# Patient Record
Sex: FEMALE | Race: WHITE | NOT HISPANIC OR LATINO | Employment: FULL TIME
[De-identification: names, ages, dates, MRNs, and addresses within clinical notes are randomized per-mention and may not be internally consistent; named-entity substitution may affect disease eponyms.]

---

## 2017-05-26 ENCOUNTER — HEALTH MAINTENANCE LETTER (OUTPATIENT)
Age: 22
End: 2017-05-26

## 2018-03-23 ENCOUNTER — NURSE TRIAGE (OUTPATIENT)
Dept: NURSING | Facility: CLINIC | Age: 23
End: 2018-03-23

## 2018-03-23 NOTE — TELEPHONE ENCOUNTER
Denise suffered cat bite, has been on abx x 24 hours.  Redness / swelling slightly worsened.  Advised at this point, to continue abx and if any worsening from now, to be seen.      Additional Information    [1] Taking antibiotic < 72 hours (3 days) AND [2] symptoms are SAME (not improved) (all triage questions negative)    Negative: [1] Taking antibiotic AND [2] symptoms are BETTER AND [3] no fever (all triage questions negative)    Negative: [1] Finished taking antibiotics AND [2] symptoms are BETTER but [3] not completely gone    Negative: [1] Taking antibiotic AND [2] new onset of fever    Negative: [1] Taking antibiotic > 48 hours (2 days) AND [2] fever still present (SAME)    Negative: [1] Taking antibiotic > 72 hours (3 days) AND [2] symptoms (other than fever) not improved    Negative: Caller has NON-URGENT question and triager unable to answer question    Negative: [1] Taking antibiotics > 24 hours AND [2] symptoms WORSE    Negative: [1] Recent hospitalization AND [2] symptoms WORSE    Negative: [1] Diabetes mellitus or weak immune system (e.g., HIV positive, cancer chemo, splenectomy, organ transplant, chronic steroids) AND [2] new onset of fever > 100.5 F (38.1 C)    Negative: Caller has URGENT question and triager unable to answer question    Negative: MODERATE difficulty breathing (e.g., speaks in phrases, SOB even at rest, pulse 100-120)    Negative: Fever > 103 F (39.4 C)    Negative: Patient sounds very sick or weak to the triager    Negative: [1] Recent hospitalization for pneumonia AND [2] taking an antibiotic    Negative: [1] Animal bite infection AND [2] taking an antibiotic    Negative: [1] Ear  infection (Otitis Media) AND [2] taking an antibiotic    Negative: [1] Ear  infection (Swimmer's Ear)) AND [2] taking an antibiotic    Negative: [1] Sinus infection AND [2] taking an antibiotic    Negative: [1] Strep throat AND [2] taking an antibiotic    Negative: [1] Urinary tract  infection (e.g.,  cystitis, pyelonephritis, urethritis, epididymitis) AND [2] male AND [3] taking an antibiotic    Negative: [1] Urinary tract  infection (e.g., cystitis, pyelonephritis, urethritis) AND [2] female AND [3] taking an antibiotic    Negative: [1] Wound infection AND [2] taking an antibiotic    Negative: Severe difficulty breathing (e.g., struggling for each breath, speaks in single words)    Negative: Sounds like a life-threatening emergency to the triager    Negative: [1] Taking antibiotics < 48 hours AND [2] fever still present (SAME) (all triage questions negative)    Protocols used: INFECTION ON [facility]IOT[facility]

## 2020-06-23 ENCOUNTER — NURSE TRIAGE (OUTPATIENT)
Dept: NURSING | Facility: CLINIC | Age: 25
End: 2020-06-23

## 2020-06-23 NOTE — TELEPHONE ENCOUNTER
Patient has had slight vaginal discharge the past few days and is now slightly green with some foul odor.  Also some itchiness in vaginal area and more than normal headaches.  Transferred to scheduling for in office appointment within 3 days.    Additional Information    Vaginal discharge is main symptom    Negative: Followed a genital area injury    Negative: Symptoms could be from sexual assault(AFTER using this guideline to treat symptoms, go to guideline SEXUAL ASSAULT OR RAPE)    Negative: Pain or burning with passing urine (urination) is main symptom    Negative: Foreign body in vagina (e.g., tampon)    Negative: [1] Pregnant > 20 weeks  (5 months or more) AND [2] contractions    Negative: Pregnant    Negative: [1] SEVERE abdominal pain (e.g., excruciating) AND [2] present > 1 hour    Negative: Patient sounds very sick or weak to the triager    Negative: [1] Yellow or green vaginal discharge AND [2] fever    Negative: [1] Genital area looks infected (e.g., draining sore, spreading redness) AND [2] fever    Negative: [1] Constant abdominal pain AND [2] present > 2 hours    Negative: [1] Mild lower abdominal pain comes and goes (cramps) AND [2] lasts > 24 hours    Negative: Genital area looks infected (e.g., draining sore, spreading redness)    Negative: [1] Rash is tiny water blisters AND [2] 3 or more    Negative: [1] Rash (e.g., redness, tiny bumps, sore) of genital area AND [2] present > 24 hours    Bad smelling vaginal discharge    Protocols used: VAGINAL SYMPTOMS-A-AH, VAGINAL [id number]

## 2020-06-25 ENCOUNTER — OFFICE VISIT (OUTPATIENT)
Dept: INTERNAL MEDICINE | Facility: CLINIC | Age: 25
End: 2020-06-25
Payer: COMMERCIAL

## 2020-06-25 VITALS
WEIGHT: 141 LBS | BODY MASS INDEX: 27.68 KG/M2 | TEMPERATURE: 98.6 F | RESPIRATION RATE: 18 BRPM | SYSTOLIC BLOOD PRESSURE: 113 MMHG | HEART RATE: 89 BPM | HEIGHT: 60 IN | DIASTOLIC BLOOD PRESSURE: 81 MMHG

## 2020-06-25 DIAGNOSIS — Z11.3 SCREEN FOR STD (SEXUALLY TRANSMITTED DISEASE): ICD-10-CM

## 2020-06-25 DIAGNOSIS — N89.8 VAGINAL DISCHARGE: Primary | ICD-10-CM

## 2020-06-25 DIAGNOSIS — B96.89 BV (BACTERIAL VAGINOSIS): ICD-10-CM

## 2020-06-25 DIAGNOSIS — N76.0 BV (BACTERIAL VAGINOSIS): ICD-10-CM

## 2020-06-25 DIAGNOSIS — N89.8 VAGINAL ITCHING: ICD-10-CM

## 2020-06-25 LAB
SPECIMEN SOURCE: ABNORMAL
WET PREP SPEC: ABNORMAL

## 2020-06-25 PROCEDURE — 99203 OFFICE O/P NEW LOW 30 MIN: CPT | Performed by: INTERNAL MEDICINE

## 2020-06-25 PROCEDURE — 87210 SMEAR WET MOUNT SALINE/INK: CPT | Performed by: INTERNAL MEDICINE

## 2020-06-25 RX ORDER — METRONIDAZOLE 500 MG/1
500 TABLET ORAL 2 TIMES DAILY
Qty: 14 TABLET | Refills: 0 | Status: SHIPPED | OUTPATIENT
Start: 2020-06-25 | End: 2020-07-02

## 2020-06-25 ASSESSMENT — MIFFLIN-ST. JEOR: SCORE: 1306.07

## 2020-06-25 NOTE — PROGRESS NOTES
Subjective     Denise Eng is a 25 year old female who presents to clinic today for the following health issues:  Patient is being seen for vaginal discharge.    HPI   Patient complains of vaginal discharge which was more than usual 2 days ago.  It was yellowish-white in color.  Patient also had symptoms of vaginal discharge and itching since 3 weeks.  Had 3-4 sexual partners in last 3 months.  Use protection with other partners and does not use protection with her .  Usually checks for chlamydia gonorrhea every 6 months.  Patient is on Nexplanon for contraception and does not remember when was her last menstrual cycle.      Patient Active Problem List   Diagnosis     Raynaud's phenomenon (by history or observed)     GERD (gastroesophageal reflux disease)     Contraception     Irregular periods     Anxiety     Past Surgical History:   Procedure Laterality Date     FRENECTOMY  9/4/2012    Procedure: FRENECTOMY;  Frenulectomy;  Surgeon: Neo Shaffer MD;  Location:  OR       Social History     Tobacco Use     Smoking status: Passive Smoke Exposure - Never Smoker     Smokeless tobacco: Never Used     Tobacco comment: Mom smokes outside.    Substance Use Topics     Alcohol use: Yes     Comment: rare     History reviewed. No pertinent family history.      Current Outpatient Medications   Medication Sig Dispense Refill     escitalopram (LEXAPRO) 10 MG tablet Take 1 tablet (10 mg) by mouth daily 90 tablet 1     etonogestrel (NEXPLANON) 68 MG IMPL 1 each (68 mg) by Subdermal route continuous 1 each 0     levonorgestrel-ethinyl estradiol (AVIANE,ALESSE,LESSINA) 0.1-20 MG-MCG per tablet Take 1 tablet by mouth daily 3 Package 3     metroNIDAZOLE (FLAGYL) 500 MG tablet Take 1 tablet (500 mg) by mouth 2 times daily for 7 days 14 tablet 0     Allergies   Allergen Reactions     No Known Allergies        Reviewed and updated as needed this visit by Provider      Review of Systems   Genitourinary: Positive for  vaginal discharge. Negative for menstrual problem and vaginal pain.          Objective    /81 (BP Location: Left arm, Patient Position: Sitting, Cuff Size: Adult Regular)   Pulse 89   Temp 98.6  F (37  C) (Oral)   Resp 18   Ht 1.524 m (5')   Wt 64 kg (141 lb)   BMI 27.54 kg/m    Body mass index is 27.54 kg/m .  Physical Exam  Vitals signs reviewed.   Genitourinary:     Comments: Creamy cervical discharge noted. No lesion in cervix, vagina noted           Assessment & Plan     Denise was seen today for vaginal problem.    Diagnoses and all orders for this visit:    Vaginal discharge  -     Wet prep  -     Neisseria gonorrhoeae PCR  -     Chlamydia trachomatis PCR  -     metroNIDAZOLE (FLAGYL) 500 MG tablet; Take 1 tablet (500 mg) by mouth 2 times daily for 7 days    Vaginal itching  -     Wet prep  -     Neisseria gonorrhoeae PCR  -     Chlamydia trachomatis PCR  -     metroNIDAZOLE (FLAGYL) 500 MG tablet; Take 1 tablet (500 mg) by mouth 2 times daily for 7 days  -     Chlamydia trachomatis PCR  -     Neisseria gonorrhoeae PCR    Screen for STD (sexually transmitted disease)  -     Wet prep  -     Neisseria gonorrhoeae PCR  -     Chlamydia trachomatis PCR  -     Chlamydia trachomatis PCR  -     Neisseria gonorrhoeae PCR    BV (bacterial vaginosis)  -     metroNIDAZOLE (FLAGYL) 500 MG tablet; Take 1 tablet (500 mg) by mouth 2 times daily for 7 days      No follow-ups on file.    Sathya العلي MD  [facility]

## 2020-06-25 NOTE — PATIENT INSTRUCTIONS
Patient Education     Vaginal Infection: Understanding the Vaginal Environment  The vagina is a canal. It connects the uterus (womb) to the outside of the body. It is home to many types of bacteria and other tiny organisms. These different bacteria most often stay balanced in number. This keeps the vagina healthy. If the balance changes, it can cause infection.   A healthy environment  Many types of bacteria are present in a healthy vagina. When balanced, they don t cause problems. Small amounts of yeast may also be present without causing problems. The most common type of bacteria in the vagina is lactobacillus. It helps keep the vagina at a low pH. A low pH keeps bad bacteria from taking over.  Normal vaginal discharge  The vagina makes fluid. It is sent out as discharge. This also keeps the vagina healthy. Normal discharge can be clear, white, or yellowish. Most women find that normal discharge varies in amount and color through the month.  An unhealthy environment  The vaginal environment may get out of balance. This may result in a vaginal infection. There are a few reasons this can happen. The pH may have changed. The amount of one organism, such as yeast, may increase. Or an outside organism may get into the vagina and throw off the balance:    Bacterial vaginosis (BV). BV is due to an imbalance in the normal bacteria in the vagina. Lactobacillus bacteria decrease. As a result, the numbers of bad bacteria increase.    Candidiasis (yeast infection). Yeast is a type of fungus. A yeast infection occurs when yeast cells in the vagina increase. They then attack vaginal tissues. A type of yeast called Candida albicans is often involved.    Trichomoniasis ( trich ). Trich is a parasite. It is passed from one person to another during sex. Men with trich often don t have any symptoms. In women, it can take weeks or months before symptoms appear.  Date Last Reviewed: 3/1/2017    5483-4225 [employer] [address]  [place] [address]. All rights reserved. This information is not intended as a substitute for professional medical care. Always follow your healthcare professional's instructions.

## 2020-06-26 LAB
C TRACH DNA SPEC QL NAA+PROBE: ABNORMAL
N GONORRHOEA DNA SPEC QL NAA+PROBE: ABNORMAL
SPECIMEN SOURCE: ABNORMAL
SPECIMEN SOURCE: ABNORMAL

## 2020-06-29 ENCOUNTER — TELEPHONE (OUTPATIENT)
Dept: INTERNAL MEDICINE | Facility: CLINIC | Age: 25
End: 2020-06-29

## 2020-06-29 DIAGNOSIS — Z11.3 SCREEN FOR STD (SEXUALLY TRANSMITTED DISEASE): Primary | ICD-10-CM

## 2020-06-29 NOTE — TELEPHONE ENCOUNTER
"Do not see another Urine Chlamydia and Gonorrhea test ordered.       Dr العلي:  \"Please tell her that chlamydia and gonorrhea testing were canceled due to collection.  I ordered a urine chlamydia and gonorrhea testing.  Please ask her to do it at her convenience.\"    "

## 2020-08-31 ENCOUNTER — VIRTUAL VISIT (OUTPATIENT)
Dept: INTERNAL MEDICINE | Facility: CLINIC | Age: 25
End: 2020-08-31
Payer: COMMERCIAL

## 2020-08-31 DIAGNOSIS — F41.9 ANXIETY: ICD-10-CM

## 2020-08-31 PROCEDURE — 99213 OFFICE O/P EST LOW 20 MIN: CPT | Mod: TEL | Performed by: NURSE PRACTITIONER

## 2020-08-31 RX ORDER — ESCITALOPRAM OXALATE 10 MG/1
10 TABLET ORAL DAILY
Qty: 90 TABLET | Refills: 0 | Status: SHIPPED | OUTPATIENT
Start: 2020-08-31 | End: 2020-10-30

## 2020-08-31 ASSESSMENT — ANXIETY QUESTIONNAIRES
7. FEELING AFRAID AS IF SOMETHING AWFUL MIGHT HAPPEN: SEVERAL DAYS
IF YOU CHECKED OFF ANY PROBLEMS ON THIS QUESTIONNAIRE, HOW DIFFICULT HAVE THESE PROBLEMS MADE IT FOR YOU TO DO YOUR WORK, TAKE CARE OF THINGS AT HOME, OR GET ALONG WITH OTHER PEOPLE: SOMEWHAT DIFFICULT
5. BEING SO RESTLESS THAT IT IS HARD TO SIT STILL: MORE THAN HALF THE DAYS
3. WORRYING TOO MUCH ABOUT DIFFERENT THINGS: NOT AT ALL
6. BECOMING EASILY ANNOYED OR IRRITABLE: MORE THAN HALF THE DAYS
2. NOT BEING ABLE TO STOP OR CONTROL WORRYING: SEVERAL DAYS
1. FEELING NERVOUS, ANXIOUS, OR ON EDGE: MORE THAN HALF THE DAYS
GAD7 TOTAL SCORE: 9

## 2020-08-31 ASSESSMENT — PATIENT HEALTH QUESTIONNAIRE - PHQ9: 5. POOR APPETITE OR OVEREATING: SEVERAL DAYS

## 2020-08-31 NOTE — PROGRESS NOTES
"Denise Simpson is a 25 year old female who is being evaluated via a billable phone visit.      The patient has been notified of following:     \"This video visit will be conducted via a call between you and your physician/provider. We have found that certain health care needs can be provided without the need for an in-person physical exam.  This service lets us provide the care you need with a video conversation.  If a prescription is necessary we can send it directly to your pharmacy.  If lab work is needed we can place an order for that and you can then stop by our lab to have the test done at a later time.    Video visits are billed at different rates depending on your insurance coverage.  Please reach out to your insurance provider with any questions.    If during the course of the call the physician/provider feels a phone visit is not appropriate, you will not be charged for this service.\"    Patient has given verbal consent for phone visit? Yes  How would you like to obtain your AVS? MyChart  If you are dropped from the video visit, the video invite should be resent to: Text to cell phone: [phone]  Will anyone else be joining your video visit? No      Subjective     Denise Simpson is a 25 year old female who presents today via video visit for the following health issues:    HPI    Anxiety Follow-Up    How are you doing with your anxiety since your last visit? No change  Has been off lexapro> 6 months, could not adhere to a schedule, got frustrated and stopped med.  Mild depression sx per her spouse    Are you having other symptoms that might be associated with anxiety? No    Have you had a significant life event? No     Are you feeling depressed? No    Do you have any concerns with your use of alcohol or other drugs? No    Social History     Tobacco Use     Smoking status: Passive Smoke Exposure - Never Smoker     Smokeless tobacco: Never Used     Tobacco comment: Mom smokes outside.    Substance " Use Topics     Alcohol use: Yes     Comment: rare     Drug use: No     MELLY-7 SCORE 6/10/2013 8/31/2020   Total Score 16 -   Total Score - 9     No flowsheet data found.  MELLY-7  8/31/2020   1. Feeling nervous, anxious, or on edge 2   2. Not being able to stop or control worrying 1   3. Worrying too much about different things 0   4. Trouble relaxing 1   5. Being so restless that it is hard to sit still 2   6. Becoming easily annoyed or irritable 2   7. Feeling afraid, as if something awful might happen 1   MELLY-7 Total Score 9   If you checked any problems, how difficult have they made it for you to do your work, take care of things at home, or get along with other people? Somewhat difficult         How many servings of fruits and vegetables do you eat daily?  2-3    On average, how many sweetened beverages do you drink each day (Examples: soda, juice, sweet tea, etc.  Do NOT count diet or artificially sweetened beverages)?   0    How many days per week do you exercise enough to make your heart beat faster? 4    How many minutes a day do you exercise enough to make your heart beat faster? 30 - 60    How many days per week do you miss taking your medication? 0             New Patient/Transfer of Care    Review of Systems   Constitutional, HEENT, cardiovascular, pulmonary, gi and gu systems are negative, except as otherwise noted.      Objective           Vitals:  No vitals were obtained today due to virtual visit.    Physical Exam     RESP: No audible wheeze, cough, or visible cyanosis.  No visible retractions or increased work of breathing.    PSYCH: Mentation appears normal, affect normal/bright, judgement and insight intact, normal speech and appearance well-groomed.              Assessment & Plan     Anxiety    - escitalopram (LEXAPRO) 10 MG tablet; Take 1 tablet (10 mg) by mouth daily  F/u in 3 months for med check  Call prn     BMI:   Estimated body mass index is 27.54 kg/m  as calculated from the following:     Height as of 6/25/20: 1.524 m (5').    Weight as of 6/25/20: 64 kg (141 lb).               Mary Rizzo NP  [facility]    Phone time 6 min

## 2020-09-01 ASSESSMENT — ANXIETY QUESTIONNAIRES: GAD7 TOTAL SCORE: 9

## 2020-09-04 ENCOUNTER — OFFICE VISIT (OUTPATIENT)
Dept: OBGYN | Facility: CLINIC | Age: 25
End: 2020-09-04
Payer: COMMERCIAL

## 2020-09-04 VITALS
BODY MASS INDEX: 27.68 KG/M2 | HEIGHT: 60 IN | DIASTOLIC BLOOD PRESSURE: 68 MMHG | WEIGHT: 141 LBS | SYSTOLIC BLOOD PRESSURE: 120 MMHG

## 2020-09-04 DIAGNOSIS — Z30.013 ENCOUNTER FOR INITIAL PRESCRIPTION OF INJECTABLE CONTRACEPTIVE: ICD-10-CM

## 2020-09-04 DIAGNOSIS — Z30.432 ENCOUNTER FOR REMOVAL OF INTRAUTERINE CONTRACEPTIVE DEVICE: ICD-10-CM

## 2020-09-04 DIAGNOSIS — Z11.3 SCREEN FOR STD (SEXUALLY TRANSMITTED DISEASE): Primary | ICD-10-CM

## 2020-09-04 DIAGNOSIS — N76.0 BACTERIAL VAGINOSIS: ICD-10-CM

## 2020-09-04 DIAGNOSIS — N89.8 VAGINAL ODOR: ICD-10-CM

## 2020-09-04 DIAGNOSIS — B96.89 BACTERIAL VAGINOSIS: ICD-10-CM

## 2020-09-04 DIAGNOSIS — Z30.09 ENCOUNTER FOR COUNSELING REGARDING CONTRACEPTION: ICD-10-CM

## 2020-09-04 LAB
SPECIMEN SOURCE: ABNORMAL
WET PREP SPEC: ABNORMAL

## 2020-09-04 PROCEDURE — 36415 COLL VENOUS BLD VENIPUNCTURE: CPT | Performed by: ADVANCED PRACTICE MIDWIFE

## 2020-09-04 PROCEDURE — 87591 N.GONORRHOEAE DNA AMP PROB: CPT | Performed by: ADVANCED PRACTICE MIDWIFE

## 2020-09-04 PROCEDURE — 87210 SMEAR WET MOUNT SALINE/INK: CPT | Performed by: ADVANCED PRACTICE MIDWIFE

## 2020-09-04 PROCEDURE — 87389 HIV-1 AG W/HIV-1&-2 AB AG IA: CPT | Performed by: ADVANCED PRACTICE MIDWIFE

## 2020-09-04 PROCEDURE — 86780 TREPONEMA PALLIDUM: CPT | Performed by: ADVANCED PRACTICE MIDWIFE

## 2020-09-04 PROCEDURE — 99202 OFFICE O/P NEW SF 15 MIN: CPT | Mod: 25 | Performed by: ADVANCED PRACTICE MIDWIFE

## 2020-09-04 PROCEDURE — 96372 THER/PROPH/DIAG INJ SC/IM: CPT | Mod: 59 | Performed by: ADVANCED PRACTICE MIDWIFE

## 2020-09-04 PROCEDURE — 87491 CHLMYD TRACH DNA AMP PROBE: CPT | Performed by: ADVANCED PRACTICE MIDWIFE

## 2020-09-04 PROCEDURE — 11982 REMOVE DRUG IMPLANT DEVICE: CPT | Performed by: ADVANCED PRACTICE MIDWIFE

## 2020-09-04 PROCEDURE — 86803 HEPATITIS C AB TEST: CPT | Performed by: ADVANCED PRACTICE MIDWIFE

## 2020-09-04 PROCEDURE — 87340 HEPATITIS B SURFACE AG IA: CPT | Performed by: ADVANCED PRACTICE MIDWIFE

## 2020-09-04 RX ORDER — MEDROXYPROGESTERONE ACETATE 150 MG/ML
150 INJECTION, SUSPENSION INTRAMUSCULAR
Status: DISCONTINUED | OUTPATIENT
Start: 2020-09-04 | End: 2020-11-18 | Stop reason: CLARIF

## 2020-09-04 RX ORDER — ACYCLOVIR 200 MG/1
200 CAPSULE ORAL
COMMUNITY
End: 2022-08-11

## 2020-09-04 RX ORDER — MEDROXYPROGESTERONE ACETATE 150 MG/ML
150 INJECTION, SUSPENSION INTRAMUSCULAR
Qty: 1 ML | Refills: 3 | OUTPATIENT
Start: 2020-09-04 | End: 2020-12-23

## 2020-09-04 RX ORDER — METRONIDAZOLE 7.5 MG/G
GEL TOPICAL DAILY
Qty: 45 G | Refills: 1 | Status: SHIPPED | OUTPATIENT
Start: 2020-09-04 | End: 2020-09-09

## 2020-09-04 RX ADMIN — MEDROXYPROGESTERONE ACETATE 150 MG: 150 INJECTION, SUSPENSION INTRAMUSCULAR at 09:55

## 2020-09-04 ASSESSMENT — MIFFLIN-ST. JEOR: SCORE: 1306.07

## 2020-09-04 NOTE — PROGRESS NOTES
Nexplanon Removal:     Is a pregnancy test required: No.  Was a consent obtained?  Yes    Denise Simpson is here for removal of etonogestrel implant Nexplanon/Implanon    Indication:       Preoperative Diagnosis: etonogestrel implant  Postoperative Diagnosis: etonogestrel implant removed

## 2020-09-04 NOTE — PROGRESS NOTES
Subjective  Denise Simpson is a 25 year old female who presents for birth control consultation. Current contraception method: Nexplanon.Sexually transmitted infection risk: moderate.  No LMP recorded. Patient has had an injection.  Menstrual flow: rare. Breastfeeding no. Has used depo provera and Nexplanon for contraception in the past.  Has used Nexplanon x 3 and it  this summer.  Does not want to use it again at this time, though did not have any problems with its use.  Reports having had vaginal odor, slight itching, and increased white thick vaginal discharge since .  She has had history of bacterial vaginosis in  and once last year.  Self reports that she had an episode in July, but was not tested at that time.  We did discuss her increased risk due to multiple sexual partners, though she does use condoms consistently.  We also reviewed that she could use suppressive therapy for bacterial vaginosis if it is there again.  She is very sure that she does not want to have children, nor does her .  They are both thinking of sterilization to help prevent pregnancy given their current lifestyle.  She was concerned that OB would not perform a tubal ligation given her age, but we discussed that there are other options that are long-term, but reversible.  At this time, she is not interested in those, but is interested in a consult.  She has used Depo-Provera in the past and we discussed using a form of birth control as a bridge until she can have the consult with OB.  After discussion of the different types of contraception, birth control pill, patch, NuvaRing, condoms, IUDs, she would like to use Depo-Provera again due to ease of its use.  We discussed side effects and ACHES.  She should call us if she has any of these concerns.  She is also interested in STD screening today as well as Nexplanon removal.      OBJECTIVE:  Vitals:    20 0903   BP: 120/68   BP Location: Left arm    Patient Position: Chair   Cuff Size: Adult Regular   Weight: 64 kg (141 lb)   Height: 1.524 m (5')       PROCEDURE:  Verbal and written consent obtained. Discussed risk of bleeding, infection, inability to remove device and bruising. Nexplanon device was palpated in her left side arm. The area was cleansed with betadine x3. Sterile gloves were donned. Then lidocaine 1% was injected under the skin at the distal end of the device. A 2mm incision was made with a scalpel over the distal end of the device, then the device was grasped with a sterile Laura clamp. The fibrous sheath encasing the Nexplanon was incised with a scalpel, then the device was removed without difficulty, intact. The incision was covered with a band-aid and the arm was wrapped with a pressure dressing for 6 hours. Patient tolerated procedure well.       Imani Yang CNM    Pertinent past medical history:   Past Medical History:   Diagnosis Date     GERD (gastroesophageal reflux disease) 9/22/2008     Past Surgical History:   Procedure Laterality Date     FRENECTOMY  9/4/2012    Procedure: FRENECTOMY;  Frenulectomy;  Surgeon: Neo Shaffer MD;  Location:  OR     Social History     Socioeconomic History     Marital status: Single     Spouse name: Not on file     Number of children: Not on file     Years of education: Not on file     Highest education level: Not on file   Occupational History     Not on file   Social Needs     Financial resource strain: Not on file     Food insecurity     Worry: Not on file     Inability: Not on file     Transportation needs     Medical: Not on file     Non-medical: Not on file   Tobacco Use     Smoking status: Passive Smoke Exposure - Never Smoker     Smokeless tobacco: Never Used     Tobacco comment: Mom smokes outside.    Substance and Sexual Activity     Alcohol use: Yes     Comment: rare     Drug use: No     Sexual activity: Yes     Partners: Male   Lifestyle     Physical activity     Days per week: Not  on file     Minutes per session: Not on file     Stress: Not on file   Relationships     Social connections     Talks on phone: Not on file     Gets together: Not on file     Attends Denominational service: Not on file     Active member of club or organization: Not on file     Attends meetings of clubs or organizations: Not on file     Relationship status: Not on file     Intimate partner violence     Fear of current or ex partner: Not on file     Emotionally abused: Not on file     Physically abused: Not on file     Forced sexual activity: Not on file   Other Topics Concern     Not on file   Social History Narrative     Not on file     No family history on file.  Current Outpatient Medications   Medication     acyclovir (ZOVIRAX) 200 MG capsule     escitalopram (LEXAPRO) 10 MG tablet     medroxyPROGESTERone (DEPO-PROVERA) 150 MG/ML IM injection     etonogestrel (NEXPLANON) 68 MG IMPL     Current Facility-Administered Medications   Medication     medroxyPROGESTERone (DEPO-PROVERA) syringe 150 mg         No LMP recorded. Patient has had an injection.      The following portions of the patient's history were reviewed and updated as appropriate: allergies, current medications, past medical history and problem list.    Review Of Systems  Skin: negative  Eyes: negative, negative for visual changes  Respiratory: No shortness of breath, dyspnea on exertion, cough, or hemoptysis  Cardiovascular: negative for heart disease  Gastrointestinal: negative  Genitourinary: positive for negative, vaginal discharge and vaginal odor x 13 days  Musculoskeletal: negative  Neurologic: negative for HA  Psychiatric: negative  Hematologic/Lymphatic/Immunologic: negative for blood clots  Endocrine: negative     Objective:     Exam:  Constitutional: healthy, alert, no distress and cooperative  Head: Normocephalic.  Cardiovascular: appears well perfused, color pink  Respiratory: negative findings: chest symmetric with normal A/P diameter, normal  respiratory rate and rhythm  : Normal external genitalia without lesions and female positive for increased white creamy vaginal discharge.  Cervix without lesions or abnormality, vagina pink, no pain with SSE  Musculoskeletal: gait normal and normal muscle tone  Skin: no suspicious lesions or rashes  Neurologic: negative findings: cranial nerves 2-12 intact  Psychiatric: mentation appears normal and affect normal/bright      Assessment:   Vaginal odor and discharge    Birth control consult: desires tubal consult and starting depo provera today as bridge with Nexplanon removal today    Plan:     -After discussion of methods, would like to use Depo provera and have OB consult for tubal.   -Verbal information given on this method. ACES (OCPs), PAINS (IUDs) warning signs reviewed.   -Labs HIV, Hep C, Hep B, RPR, wet prep, GC/CT  -Rx written for Depo q 3 mo.     TT with patient 30 mns, >50% time spent in counseling or coordination of care.     Imani Yang CNM

## 2020-09-04 NOTE — NURSING NOTE
Chief Complaint   Patient presents with     Vaginal Problem     Nexplanon    Clinic Administered Medication Documentation      Depo Provera Documentation    URINE HCG: not indicated    Depo-Provera Standing Order inclusion/exclusion criteria reviewed.   Patient meets: inclusion criteria     BP: 120/68  LAST PAP/EXAM: No results found for: PAP    Prior to injection, verified patient identity using patient's name and date of birth. Medication was administered. Please see MAR and medication order for additional information.     Was entire vial of medication used? Yes  Vial/Syringe: Single dose vial  Expiration Date:    Right Glute  Teresa Lyle CMA    Patient instructed to report any adverse reaction to staff immediately .  NEXT INJECTION DUE: 20 - 20        Initial /68 (BP Location: Left arm, Patient Position: Chair, Cuff Size: Adult Regular)   Ht 1.524 m (5')   Wt 64 kg (141 lb)   Breastfeeding No   BMI 27.54 kg/m   Estimated body mass index is 27.54 kg/m  as calculated from the following:    Height as of this encounter: 1.524 m (5').    Weight as of this encounter: 64 kg (141 lb).  BP completed using cuff size: regular    Questioned patient about current smoking habits.  Pt. has never smoked.      No obstetric history on file.    The following HM Due: NONE    Teresa Lyle CMA

## 2020-09-05 LAB — T PALLIDUM AB SER QL: NONREACTIVE

## 2020-09-06 LAB
C TRACH DNA SPEC QL NAA+PROBE: NEGATIVE
HBV SURFACE AG SERPL QL IA: NONREACTIVE
HCV AB SERPL QL IA: NONREACTIVE
HIV 1+2 AB+HIV1 P24 AG SERPL QL IA: NONREACTIVE
N GONORRHOEA DNA SPEC QL NAA+PROBE: NEGATIVE
SPECIMEN SOURCE: NORMAL
SPECIMEN SOURCE: NORMAL

## 2020-09-09 ENCOUNTER — TELEPHONE (OUTPATIENT)
Facility: CLINIC | Age: 25
End: 2020-09-09

## 2020-09-09 DIAGNOSIS — N76.0 BACTERIAL VAGINOSIS: Primary | ICD-10-CM

## 2020-09-09 DIAGNOSIS — B96.89 BACTERIAL VAGINOSIS: Primary | ICD-10-CM

## 2020-09-09 RX ORDER — METRONIDAZOLE 7.5 MG/G
1 GEL VAGINAL DAILY
Qty: 25 G | Refills: 0 | Status: SHIPPED | OUTPATIENT
Start: 2020-09-09 | End: 2020-09-14

## 2020-09-16 ENCOUNTER — MYC MEDICAL ADVICE (OUTPATIENT)
Dept: INTERNAL MEDICINE | Facility: CLINIC | Age: 25
End: 2020-09-16

## 2020-10-30 ENCOUNTER — OFFICE VISIT (OUTPATIENT)
Dept: OBGYN | Facility: CLINIC | Age: 25
End: 2020-10-30
Payer: COMMERCIAL

## 2020-10-30 VITALS
SYSTOLIC BLOOD PRESSURE: 122 MMHG | BODY MASS INDEX: 26.31 KG/M2 | HEIGHT: 60 IN | WEIGHT: 134 LBS | DIASTOLIC BLOOD PRESSURE: 60 MMHG

## 2020-10-30 DIAGNOSIS — Z30.09 ENCOUNTER FOR COUNSELING REGARDING CONTRACEPTION: Primary | ICD-10-CM

## 2020-10-30 DIAGNOSIS — N94.89 MENSTRUAL SUPPRESSION: ICD-10-CM

## 2020-10-30 DIAGNOSIS — Z30.2 ENCOUNTER FOR STERILIZATION: ICD-10-CM

## 2020-10-30 PROCEDURE — 99214 OFFICE O/P EST MOD 30 MIN: CPT | Performed by: OBSTETRICS & GYNECOLOGY

## 2020-10-30 ASSESSMENT — MIFFLIN-ST. JEOR: SCORE: 1274.32

## 2020-10-30 NOTE — NURSING NOTE
Chief Complaint   Patient presents with     Consult     tubal ligation       Initial /60 (BP Location: Right arm, Patient Position: Chair, Cuff Size: Adult Large)   Ht 1.524 m (5')   Wt 60.8 kg (134 lb)   Breastfeeding No   BMI 26.17 kg/m   Estimated body mass index is 26.17 kg/m  as calculated from the following:    Height as of this encounter: 1.524 m (5').    Weight as of this encounter: 60.8 kg (134 lb).  BP completed using cuff size: regular    Questioned patient about current smoking habits.  Pt. has never smoked.      No obstetric history on file.    The following HM Due: NONE  Teresa Lyle, CMA

## 2020-10-30 NOTE — PROGRESS NOTES
SUBJECTIVE:                                                   CC:  Patient presents with:  Consult: tubal ligation      HPI:  Denise Box is a 25 year old  who presents to discuss tubal ligation.    She is  to Omer Tejada, they have been together for years.  He is present on the telephone for today's visit.  Both agree that they never want children.  They have been discussing it since they were about 20 years old.  She has been on nexplanon x3 and most recently got it out and got on depo.    No h/o abdominal surgery.   Has no periods on the nexplanon/depo.  They are also thinking that Omer will get a vasectomy but for right now she just wants to get a BTL first.  Has done a lot of reading about it.     ROS: 10 point ROS negative other than as listed above in HPI.    Gyn History:  No LMP recorded. Patient has had an injection.     Patient is sexually active.  Using depo or other injectable for contraception.    Last 3 Pap and HPV Results:     NILM 2018    PMH, PSH, Soc Hx, Fam Hx, Meds, and allergies reviewed in Epic.  Denies family history of gyn cancer    OBJECTIVE:     /60 (BP Location: Right arm, Patient Position: Chair, Cuff Size: Adult Large)   Ht 1.524 m (5')   Wt 60.8 kg (134 lb)   Breastfeeding No   BMI 26.17 kg/m      Gen: Healthy appearing female, no acute distress, comfortable    ASSESSMENT/PLAN:                                                      1. Encounter for counseling regarding contraception/ Encounter for sterilization  Pt has tried nexplanon and other forms of birth control and does not want any children, is certain and has known this for years.  She understands that the risk of ectopic pregnancy is higher if she is to get pregnant after tubal ligation. She understands that the procedure is irreversible. She understands the risks of surgery.    Recommend laparoscopic bilateral salpingectomy, Mirena IUD placement.   Discussed recovery expectations and  that this is typically a same-day surgery.  Pt has had all questions answered and has agreed to proceed.  Will sign consent form day of the procedure.  Pre-op orders entered.    - [facility]-Operative Worksheet GYN     2. Menstrual suppression  Reviewed added benefit of Mirena IUD insertion for menstrual suppression.  She is going to consider it and potentially have it placed concomitantly with the BTL surgery.        Hali Hernandez MD, MPH  Obstetrics and Gynecology      Total time spent was 25 minutes; this includes pre-work time, intra-service time, and post-work time including time spent on documentation.

## 2020-11-03 ENCOUNTER — PREP FOR PROCEDURE (OUTPATIENT)
Dept: OBGYN | Facility: CLINIC | Age: 25
End: 2020-11-03

## 2020-11-03 DIAGNOSIS — Z30.2 ENCOUNTER FOR STERILIZATION: Primary | ICD-10-CM

## 2020-11-03 DIAGNOSIS — N94.89 MENSTRUAL SUPPRESSION: ICD-10-CM

## 2020-11-04 ENCOUNTER — MYC MEDICAL ADVICE (OUTPATIENT)
Dept: OBGYN | Facility: CLINIC | Age: 25
End: 2020-11-04

## 2020-11-04 ENCOUNTER — TELEPHONE (OUTPATIENT)
Dept: OBGYN | Facility: CLINIC | Age: 25
End: 2020-11-04

## 2020-11-04 DIAGNOSIS — Z01.818 PREPROCEDURAL EXAMINATION: Primary | ICD-10-CM

## 2020-11-04 DIAGNOSIS — Z20.822 COVID-19 RULED OUT: ICD-10-CM

## 2020-11-04 NOTE — TELEPHONE ENCOUNTER
Procedure name(s) - multi select laparoscopic bilateral salpingectomy, Mirena IUD placement   Reason for procedure desires sterilization and menstrual suppression   Is this a multi surgeon case? No   Laterality Bilateral   Request for additional equipment Other (see comments)   Comment: None   Anesthesia General   Initiate Pre-op orders for above procedure: Yes, as ordered in Epic   Comment: Additional orders noted there also   Location of Case: [facility]   Surgeon Procedure Time (incision to closure) in minutes (per procedure as applicable) 30   Note:  Surgical Case Time Needed (in minutes)   Patient Class (for admit prior to surgery, specify number of days in comments): Same day (hospital outpatient)   Why can t this outpatient surgery be done at the [facility] or [facility]? na   Vendor Needed? No

## 2020-11-09 NOTE — TELEPHONE ENCOUNTER
Type of surgery: laparoscopic bilateral salpingectomy, possible vaginal skin tag removal  Location of surgery: Other: [facility]  Date and time of surgery: 12/7/20 @ 2:15 pm  Surgeon: Dr. Kely Hernandez  Pre-Op Appt Date: Patient advised to schedule.  Post-Op Appt Date:    Packet sent out: Yes  Pre-cert/Authorization completed:  No  Date: 11/9/20

## 2020-11-18 ENCOUNTER — OFFICE VISIT (OUTPATIENT)
Dept: INTERNAL MEDICINE | Facility: CLINIC | Age: 25
End: 2020-11-18
Payer: COMMERCIAL

## 2020-11-18 VITALS
HEART RATE: 70 BPM | RESPIRATION RATE: 20 BRPM | BODY MASS INDEX: 26.5 KG/M2 | WEIGHT: 135 LBS | OXYGEN SATURATION: 100 % | HEIGHT: 60 IN | TEMPERATURE: 98.7 F | SYSTOLIC BLOOD PRESSURE: 102 MMHG | DIASTOLIC BLOOD PRESSURE: 60 MMHG

## 2020-11-18 DIAGNOSIS — R19.7 DIARRHEA, UNSPECIFIED TYPE: ICD-10-CM

## 2020-11-18 DIAGNOSIS — K21.9 GASTROESOPHAGEAL REFLUX DISEASE WITHOUT ESOPHAGITIS: ICD-10-CM

## 2020-11-18 DIAGNOSIS — Z01.818 PREOP GENERAL PHYSICAL EXAM: Primary | ICD-10-CM

## 2020-11-18 DIAGNOSIS — Z30.2 ENCOUNTER FOR STERILIZATION: ICD-10-CM

## 2020-11-18 PROCEDURE — 99214 OFFICE O/P EST MOD 30 MIN: CPT | Performed by: NURSE PRACTITIONER

## 2020-11-18 ASSESSMENT — MIFFLIN-ST. JEOR: SCORE: 1278.86

## 2020-11-18 NOTE — PROGRESS NOTES
[place] [address]  [facility] [address]  Phone: [phone]  Primary Provider: Skylar Pineda  Pre-op Performing Provider: CARLOS MELGAR    PREOPERATIVE EVALUATION:  Today's date: 11/18/2020    Denise Box is a 25 year old female who presents for a preoperative evaluation.    Surgical Information:  Surgery/Procedure: tubal ligation  Surgery Location: [facility]  Surgeon: Mary  Surgery Date: 12-7-2020  Time of Surgery: 2:15 pm  Where patient plans to recover: At home with family  Fax number for surgical facility: Note does not need to be faxed, will be available electronically in Epic.    Type of Anesthesia Anticipated: General    Subjective     HPI related to upcoming procedure: desire for tubal ligation  Desire for sterilization - she never wants to birth a child       Preop Questions 11/17/2020   1. Have you ever had a heart attack or stroke? No   2. Have you ever had surgery on your heart or blood vessels, such as a stent placement, a coronary artery bypass, or surgery on an artery in your head, neck, heart, or legs? No   3. Do you have chest pain with activity? No   4. Do you have a history of  heart failure? No   5. Do you currently have a cold, bronchitis or symptoms of other infection? No   6. Do you have a cough, shortness of breath, or wheezing? No   7. Do you or anyone in your family have previous history of blood clots? YES - maternal grandma DVT   8. Do you or does anyone in your family have a serious bleeding problem such as prolonged bleeding following surgeries or cuts? No   9. Have you ever had problems with anemia or been told to take iron pills? No   10. Have you had any abnormal blood loss such as black, tarry or bloody stools, or abnormal vaginal bleeding? No   11. Have you ever had a blood transfusion? No   12. Are you willing to have a blood transfusion if it is medically needed before, during, or after your surgery?  Yes   13. Have you or any of your relatives ever had problems with anesthesia? No   14. Do you have sleep apnea, excessive snoring or daytime drowsiness? No   15. Do you have any artifical heart valves or other implanted medical devices like a pacemaker, defibrillator, or continuous glucose monitor? No   16. Do you have artificial joints? No   17. Are you allergic to latex? No   18. Is there any chance that you may be pregnant? UNKNOWN - on DEPO        Health Care Directive:  Patient does not have a Health Care Directive or Living Will: Discussed advance care planning with patient; however, patient declined at this time.    [id number]}        Review of Systems  CONSTITUTIONAL: NEGATIVE for fever, chills, change in weight  INTEGUMENTARY/SKIN: NEGATIVE for worrisome rashes, moles or lesions  EYES: NEGATIVE for vision changes or irritation  ENT/MOUTH: NEGATIVE for ear, mouth and throat problems  RESP: NEGATIVE for significant cough or SOB  CV: NEGATIVE for chest pain, palpitations or peripheral edema  GI: NEGATIVE for nausea, abdominal pain, heartburn, or change in bowel habits- usually has watery stools   : NEGATIVE for frequency, dysuria, or hematuria  MUSCULOSKELETAL: NEGATIVE for significant arthralgias or myalgia  NEURO: NEGATIVE for weakness, dizziness or paresthesias  ENDOCRINE: NEGATIVE for temperature intolerance, skin/hair changes  HEME: NEGATIVE for bleeding problems  PSYCHIATRIC: NEGATIVE for changes in mood or affect    Patient Active Problem List    Diagnosis Date Noted     Anxiety 04/09/2013     Priority: Medium     Contraception 01/09/2013     Priority: Medium     Irregular periods 01/09/2013     Priority: Medium     GERD (gastroesophageal reflux disease) 11/29/2011     Priority: Medium     Raynaud's phenomenon (by history or observed) 01/31/2011     Priority: Medium      Past Medical History:   Diagnosis Date     GERD (gastroesophageal reflux disease) 9/22/2008     Past Surgical History:   Procedure  Laterality Date     FRENECTOMY  9/4/2012    Procedure: FRENECTOMY;  Frenulectomy;  Surgeon: Neo Shaffer MD;  Location:  OR     Current Outpatient Medications   Medication Sig Dispense Refill     medroxyPROGESTERone (DEPO-PROVERA) 150 MG/ML IM injection Inject 1 mL (150 mg) into the muscle every 3 months 1 mL 3     sertraline (ZOLOFT) 50 MG tablet Take 1 tablet (50 mg) by mouth daily 90 tablet 1     acyclovir (ZOVIRAX) 200 MG capsule Take 200 mg by mouth 5 times daily         Allergies   Allergen Reactions     No Known Allergies         Social History     Tobacco Use     Smoking status: Passive Smoke Exposure - Never Smoker     Smokeless tobacco: Never Used     Tobacco comment: Mom smokes outside.    Substance Use Topics     Alcohol use: Yes     Comment: rare     Family History   Problem Relation Age of Onset     Heart Failure Mother      Cervical Cancer Mother      Diabetes Mother      Alcoholism Father      Breast Cancer Maternal Grandmother      Diabetes Maternal Grandmother      Heart Disease Maternal Grandfather      Crohn's Disease Paternal Grandmother         colostomy      History   Drug Use No         Objective     /60   Pulse 70   Temp 98.7  F (37.1  C) (Oral)   Resp 20   Ht 1.524 m (5')   Wt 61.2 kg (135 lb)   SpO2 100%   BMI 26.37 kg/m      Physical Exam    GENERAL APPEARANCE: alert and no distress     HENT: ear canals and TM's normal and nose and mouth without ulcers or lesions     RESP: lungs clear to auscultation - no rales, rhonchi or wheezes     CV: regular rates and rhythm, normal S1 S2, no S3 or S4 and no murmur, click or rub     ABDOMEN:  soft, nontender, no HSM or masses and bowel sounds normal     MS: extremities normal- no gross deformities noted, no evidence of inflammation in joints, FROM in all extremities.     SKIN: no suspicious lesions or rashes     NEURO: Normal strength and tone, sensory exam grossly normal, mentation intact and speech normal     PSYCH: mentation  appears normal. and affect normal/bright    No results for input(s): HGB, PLT, INR, NA, POTASSIUM, CR, A1C in the last 34722 hours.     Diagnostics:  No labs were ordered during this visit.   No EKG required for low risk surgery (cataract, skin procedure, breast biopsy, etc).    Revised Cardiac Risk Index (RCRI):  The patient has the following serious cardiovascular risks for perioperative complications:   - No serious cardiac risks = 0 points     RCRI Interpretation: 0 points: Class I (very low risk - 0.4% complication rate)           Assessment & Plan   The proposed surgical procedure is considered LOW risk.    Preop general physical exam      Encounter for sterilization  She does not want to birth any children      Gastroesophageal reflux disease without esophagitis    - GASTROENTEROLOGY ADULT REF CONSULT ONLY; Future    Diarrhea, unspecified type  Needs evaluation   - GASTROENTEROLOGY ADULT REF CONSULT ONLY; Future           Medication Instructions:  Patient is to take all scheduled medications on the day of surgery   zoloft     RECOMMENDATION:  APPROVAL GIVEN to proceed with proposed procedure, without further diagnostic evaluation.    Signed Electronically by: JUAN Ghosh CNP    Copy of this evaluation report is provided to requesting physician.    [facility]    Revised Cardiac Risk Index

## 2020-11-18 NOTE — NURSING NOTE
Chief Complaint   Patient presents with     Pre-Op Exam     initial /60   Pulse 70   Temp 98.7  F (37.1  C) (Oral)   Resp 20   Ht 1.524 m (5')   Wt 61.2 kg (135 lb)   SpO2 100%   BMI 26.37 kg/m   Estimated body mass index is 26.37 kg/m  as calculated from the following:    Height as of this encounter: 1.524 m (5').    Weight as of this encounter: 61.2 kg (135 lb)..  bp completed using cuff size regular  MAYRA ALVARADO LPN

## 2020-11-30 ENCOUNTER — MYC MEDICAL ADVICE (OUTPATIENT)
Dept: OBGYN | Facility: CLINIC | Age: 25
End: 2020-11-30

## 2020-12-01 NOTE — TELEPHONE ENCOUNTER
Patient called and requested to reschedule surgery to January 2021.  She stated that her current insurance will not cover the procedure.  She will have new insurance beginning in January.    Rescheduled surgery with Phil at [facility] to 1/13/21 @ 8:15 am.  Updated outlook calendar.

## 2020-12-03 ENCOUNTER — MYC MEDICAL ADVICE (OUTPATIENT)
Dept: OBGYN | Facility: CLINIC | Age: 25
End: 2020-12-03

## 2020-12-23 ENCOUNTER — OFFICE VISIT (OUTPATIENT)
Dept: INTERNAL MEDICINE | Facility: CLINIC | Age: 25
End: 2020-12-23
Payer: COMMERCIAL

## 2020-12-23 VITALS
HEIGHT: 60 IN | OXYGEN SATURATION: 99 % | TEMPERATURE: 99 F | WEIGHT: 139 LBS | DIASTOLIC BLOOD PRESSURE: 70 MMHG | SYSTOLIC BLOOD PRESSURE: 116 MMHG | BODY MASS INDEX: 27.29 KG/M2 | HEART RATE: 84 BPM | RESPIRATION RATE: 18 BRPM

## 2020-12-23 DIAGNOSIS — Z01.818 PREOP GENERAL PHYSICAL EXAM: Primary | ICD-10-CM

## 2020-12-23 DIAGNOSIS — F41.9 ANXIETY: ICD-10-CM

## 2020-12-23 DIAGNOSIS — Z30.2 ENCOUNTER FOR STERILIZATION: ICD-10-CM

## 2020-12-23 PROCEDURE — 99213 OFFICE O/P EST LOW 20 MIN: CPT | Performed by: NURSE PRACTITIONER

## 2020-12-23 ASSESSMENT — MIFFLIN-ST. JEOR: SCORE: 1297

## 2020-12-23 NOTE — NURSING NOTE
Chief Complaint   Patient presents with     Pre-Op Exam     initial /70   Pulse 84   Temp 99  F (37.2  C) (Oral)   Resp 18   Ht 1.524 m (5')   Wt 63 kg (139 lb)   LMP  (LMP Unknown)   SpO2 99%   Breastfeeding No   BMI 27.15 kg/m   Estimated body mass index is 27.15 kg/m  as calculated from the following:    Height as of this encounter: 1.524 m (5').    Weight as of this encounter: 63 kg (139 lb)..  bp completed using cuff size regular  MAYRA ALVARADO LPN

## 2020-12-23 NOTE — PROGRESS NOTES
[place] [address]  [facility] [address]  Phone: [phone]  Primary Provider: Skylar Pineda  Pre-op Performing Provider: CARLOS MELGAR    PREOPERATIVE EVALUATION:  Today's date: 12/23/2020    Denise Box is a 25 year old female who presents for a preoperative evaluation.    Surgical Information:  Surgery/Procedure: tubal ligation  Surgery Location: [facility]  Surgeon: Mary  Surgery Date: 1-  Time of Surgery: 8:15 am  Where patient plans to recover: At home with family  Fax number for surgical facility: Note does not need to be faxed, will be available electronically in Epic.    Type of Anesthesia Anticipated: General    Subjective     desire for tubal ligation  Desire for sterilization - she never wants to birth a child      Preoperative Questionnaire:   No - Have you ever had a heart attack or stroke?  No - Have you ever had surgery on your heart or blood vessels, such as a stent, coronary (heart) bypass, or surgery on an artery in the head, neck, heart, or legs?  No - Do you have chest pain when you are physically active?  No - Do you have a history of heart failure?  No - Do you currently have a cold, bronchitis, or symptoms of other respiratory (head and chest) infections?  No - Do you have a cough, shortness of breath, or wheezing?  yes - Do you or anyone in your family have a history of blood clots? Maternal grandma DVT   No - Do you or anyone in your family have a serious bleeding problem, such as long-lasting bleeding after surgeries or cuts?  No - Have you ever had anemia or been told to take iron pills?  No - Have you had any abnormal blood loss such as black, tarry or bloody stools, or abnormal vaginal bleeding?  No - Have you ever had a blood transfusion?  Yes - Are you willing to have a blood transfusion if it is medically needed before, during, or after your surgery?  No - Have you or anyone in your family ever had problems with  anesthesia (sedation for surgery)?  No - Do you have sleep apnea, excessive snoring, or daytime drowsiness?   No - Do you have any artifical heart valves or other implanted medical devices, such as a pacemaker, defibrillator, or continuous glucose monitor?  No - Do you have any artifical joints?  No - Are you allergic to latex?  No - Is there any chance that you may be pregnant?    Patient has a Health Care Directive or Living Will:  NO        Health Care Directive:  Patient does not have a Health Care Directive or Living Will:       Status of Chronic Conditions:  Anxiety well controlled with medication     Review of Systems  Constitutional, neuro, ENT, endocrine, pulmonary, cardiac, gastrointestinal, genitourinary, musculoskeletal, integument and psychiatric systems are negative, except as otherwise noted.    Patient Active Problem List    Diagnosis Date Noted     Anxiety 04/09/2013     Priority: Medium     Contraception 01/09/2013     Priority: Medium     Irregular periods 01/09/2013     Priority: Medium     GERD (gastroesophageal reflux disease) 11/29/2011     Priority: Medium     Raynaud's phenomenon (by history or observed) 01/31/2011     Priority: Medium      Past Medical History:   Diagnosis Date     GERD (gastroesophageal reflux disease) 9/22/2008     Past Surgical History:   Procedure Laterality Date     FRENECTOMY  9/4/2012    Procedure: FRENECTOMY;  Frenulectomy;  Surgeon: Neo Shaffer MD;  Location:  OR     Current Outpatient Medications   Medication Sig Dispense Refill     acyclovir (ZOVIRAX) 200 MG capsule Take 200 mg by mouth 5 times daily       sertraline (ZOLOFT) 50 MG tablet Take 1 tablet (50 mg) by mouth daily 90 tablet 1       Allergies   Allergen Reactions     No Known Allergies         Social History     Tobacco Use     Smoking status: Passive Smoke Exposure - Never Smoker     Smokeless tobacco: Never Used     Tobacco comment: Mom smokes outside.    Substance Use Topics     Alcohol use:  Yes     Comment: rare     Family History   Problem Relation Age of Onset     Heart Failure Mother      Cervical Cancer Mother      Diabetes Mother      Alcoholism Father      Breast Cancer Maternal Grandmother      Diabetes Maternal Grandmother      Deep Vein Thrombosis (DVT) Maternal Grandmother      Heart Disease Maternal Grandfather      Crohn's Disease Paternal Grandmother         colostomy      History   Drug Use No         Objective     /70   Pulse 84   Temp 99  F (37.2  C) (Oral)   Resp 18   Ht 1.524 m (5')   Wt 63 kg (139 lb)   LMP  (LMP Unknown)   SpO2 99%   Breastfeeding No   BMI 27.15 kg/m      Physical Exam    GENERAL APPEARANCE: healthy, alert and no distress     HENT: ear canals and TM's normal and nose and mouth without ulcers or lesions     RESP: lungs clear to auscultation - no rales, rhonchi or wheezes     CV: regular rates and rhythm, normal S1 S2, no S3 or S4 and no murmur, click or rub     ABDOMEN:  soft, nontender, no HSM or masses and bowel sounds normal     MS: extremities normal- no gross deformities noted, no evidence of inflammation in joints, FROM in all extremities.     SKIN: no suspicious lesions or rashes     NEURO: Normal strength and tone, sensory exam grossly normal, mentation intact and speech normal     PSYCH: mentation appears normal. and affect normal/bright    No results for input(s): HGB, PLT, INR, NA, POTASSIUM, CR, A1C in the last 98894 hours.     Diagnostics:  No labs were ordered during this visit.   No EKG required for low risk surgery (cataract, skin procedure, breast biopsy, etc).    Revised Cardiac Risk Index (RCRI):  The patient has the following serious cardiovascular risks for perioperative complications:   - No serious cardiac risks = 0 points     RCRI Interpretation: 0 points: Class I (very low risk - 0.4% complication rate)           Assessment & Plan   The proposed surgical procedure is considered INTERMEDIATE risk.    Preop general physical  exam      Encounter for sterilization  Desire for no baby ever- wants tubal ligation      Anxiety  Controlled with medication         Risks and Recommendations:  The patient has the following additional risks and recommendations for perioperative complications:   - No identified additional risk factors other than previously addressed    Medication Instructions:  Patient is to take all scheduled medications on the day of surgery    RECOMMENDATION:  APPROVAL GIVEN to proceed with proposed procedure, without further diagnostic evaluation.    Signed Electronically by: JUAN Ghosh CNP    Copy of this evaluation report is provided to requesting physician.    Preop [facility] Preop Guidelines    Revised Cardiac Risk Index

## 2021-01-09 ENCOUNTER — HEALTH MAINTENANCE LETTER (OUTPATIENT)
Age: 26
End: 2021-01-09

## 2021-01-10 DIAGNOSIS — Z01.818 PREPROCEDURAL EXAMINATION: ICD-10-CM

## 2021-01-10 DIAGNOSIS — Z20.822 COVID-19 RULED OUT: ICD-10-CM

## 2021-01-11 LAB
SARS-COV-2 RNA RESP QL NAA+PROBE: NORMAL
SPECIMEN SOURCE: NORMAL

## 2021-01-12 LAB
LABORATORY COMMENT REPORT: NORMAL
SARS-COV-2 RNA RESP QL NAA+PROBE: NEGATIVE
SPECIMEN SOURCE: NORMAL

## 2021-01-13 ENCOUNTER — HOSPITAL (OUTPATIENT)
Dept: OBGYN | Facility: CLINIC | Age: 26
End: 2021-01-13

## 2021-01-13 ENCOUNTER — HOSPITAL PATHOLOGY (OUTPATIENT)
Dept: OTHER | Facility: CLINIC | Age: 26
End: 2021-01-13

## 2021-01-13 ENCOUNTER — TRANSFERRED RECORDS (OUTPATIENT)
Dept: HEALTH INFORMATION MANAGEMENT | Facility: CLINIC | Age: 26
End: 2021-01-13

## 2021-01-13 NOTE — PROGRESS NOTES
Operative Note    Patient: Denise Box  : 1995  MRN: [id number]    Date of Service: 2021    Pre-operative diagnosis:  Desires sterilization    Post-operative diagnosis:  Same     Procedure:   Laparoscopic tubal ligation via bilateral salpingectomy    Surgeon: Hali Hernandez MD    Anesthesia: General    EBL: 5cc  Urine: 100cc  Fluids: 800cc    Specimens: bilateral fallopian tubes  Complications: none apparent    Findings: normal uterus, tubes, and ovaries    Indications: Denise Box is a 26 year old nuliparous female who desired permanent sterilization.  She and her partner were counseled on alternative options but she remained certain that she desired this option.  Discussed risks, benefits, and alternatives to the procedure including risk of infection, bleeding, damage to local organs, blood clots, risk of regret. The patient's questions were answered, understanding confirmed, and the patient signed written informed consent.    Procedure: The patient was taken to the operating room where she underwent anesthesia and was prepped and draped in the usual sterile fashion in dorsal lithotomy position.  A time-out was performed.      A khoury catheter was placed.  A speculum was inserted and a uterine manipulator was placed in through the cervix. Attention was then turned to the abdomen where 0.5% bupivicaine was used to infiltrate the inferior aspect of the umbilicus. An 11-blade scalpel was used to make a 5 mm vertical incision in the umbilicus. A Veress needle was used to access the peritoneum through the umbilical incision, and saline syringe returned no blood or stool and saline dripped into needle quickly. CO2 gas was attached to the needle and opening pressure was less than 5 mmHg, and flow was increased to high flow. Pneumoperitoneum was achieved with good tympany of the abdomen. A 5 mm trocar was used to place the first port. The 5 mm scope was placed in the port and visualized  the abdomen which was free of any injury. A 5 mm port was placed in the RLQ with similar technique and under direct visualization. A 5mm port was placed in the LLQ of the abdomen with similar technique under visualization.  A blunt probe was used to manipulate the ovaries and uterus for visualization of the entire pelvis. Findings as above. Pictures were taken.      The bilateral fallopian tubes were elevated at the fimbriated end, serially dissected to the cornua, amputated, and removed from the pelvis using thunderbeat cautery, taking care to retain the IP ligament blood flow to the ovaries bilaterally.      A final visual sweep of the pelvis showed no further pathology.  The pneumoperitoneum was expelled and ports removed. The skin incisions were closed using 4-0 vicryl in a subcuticular fashion. The speculum was reinserted into the vagina, the uterine manipulator removed, and the tenaculum removed from the cervix.  Good hemostasis was noted. The khoury was removed.     Instrument, needle, and sponge counts were correct times 2.  The patient was extubated in the OR and transferred to the PACU in stable condition.    Hali Hernandez MD, MPH  [facility]

## 2021-01-14 LAB — COPATH REPORT: NORMAL

## 2021-02-17 ENCOUNTER — TRANSFERRED RECORDS (OUTPATIENT)
Dept: HEALTH INFORMATION MANAGEMENT | Facility: CLINIC | Age: 26
End: 2021-02-17

## 2021-04-05 ENCOUNTER — MYC MEDICAL ADVICE (OUTPATIENT)
Dept: INTERNAL MEDICINE | Facility: CLINIC | Age: 26
End: 2021-04-05

## 2021-04-05 DIAGNOSIS — F41.9 ANXIETY: Primary | ICD-10-CM

## 2021-04-05 DIAGNOSIS — F41.9 ANXIETY: ICD-10-CM

## 2021-04-30 ENCOUNTER — TELEPHONE (OUTPATIENT)
Dept: OBGYN | Facility: CLINIC | Age: 26
End: 2021-04-30

## 2021-04-30 NOTE — TELEPHONE ENCOUNTER
I left a message on [facility] phone that she will have to call the business office, gave her the number, to figure the coding issue out  Teresa Lyle, CMA

## 2021-10-23 ENCOUNTER — HEALTH MAINTENANCE LETTER (OUTPATIENT)
Age: 26
End: 2021-10-23

## 2021-12-20 ENCOUNTER — TRANSFERRED RECORDS (OUTPATIENT)
Dept: HEALTH INFORMATION MANAGEMENT | Facility: CLINIC | Age: 26
End: 2021-12-20

## 2022-02-12 ENCOUNTER — HEALTH MAINTENANCE LETTER (OUTPATIENT)
Age: 27
End: 2022-02-12

## 2022-08-11 ENCOUNTER — OFFICE VISIT (OUTPATIENT)
Dept: FAMILY MEDICINE | Facility: CLINIC | Age: 27
End: 2022-08-11
Payer: COMMERCIAL

## 2022-08-11 ENCOUNTER — MYC MEDICAL ADVICE (OUTPATIENT)
Dept: FAMILY MEDICINE | Facility: CLINIC | Age: 27
End: 2022-08-11

## 2022-08-11 VITALS
WEIGHT: 146 LBS | DIASTOLIC BLOOD PRESSURE: 70 MMHG | OXYGEN SATURATION: 99 % | HEART RATE: 76 BPM | HEIGHT: 61 IN | SYSTOLIC BLOOD PRESSURE: 96 MMHG | BODY MASS INDEX: 27.56 KG/M2

## 2022-08-11 DIAGNOSIS — R35.0 URINARY FREQUENCY: ICD-10-CM

## 2022-08-11 DIAGNOSIS — A60.00 GENITAL HERPES SIMPLEX, UNSPECIFIED SITE: ICD-10-CM

## 2022-08-11 DIAGNOSIS — B96.89 BV (BACTERIAL VAGINOSIS): Primary | ICD-10-CM

## 2022-08-11 DIAGNOSIS — Z11.3 SCREEN FOR STD (SEXUALLY TRANSMITTED DISEASE): ICD-10-CM

## 2022-08-11 DIAGNOSIS — Z00.00 ROUTINE ADULT HEALTH MAINTENANCE: Primary | ICD-10-CM

## 2022-08-11 DIAGNOSIS — N76.0 BV (BACTERIAL VAGINOSIS): Primary | ICD-10-CM

## 2022-08-11 LAB
ALBUMIN UR-MCNC: NEGATIVE MG/DL
APPEARANCE UR: CLEAR
BILIRUB UR QL STRIP: NEGATIVE
CLUE CELLS: PRESENT
COLOR UR AUTO: YELLOW
GLUCOSE UR STRIP-MCNC: NEGATIVE MG/DL
HBV SURFACE AG SERPL QL IA: NONREACTIVE
HCV AB SERPL QL IA: NONREACTIVE
HGB UR QL STRIP: NEGATIVE
HIV 1+2 AB+HIV1 P24 AG SERPL QL IA: NONREACTIVE
KETONES UR STRIP-MCNC: NEGATIVE MG/DL
LEUKOCYTE ESTERASE UR QL STRIP: NEGATIVE
NITRATE UR QL: NEGATIVE
PH UR STRIP: 6.5 [PH] (ref 5–8)
SP GR UR STRIP: 1.02 (ref 1–1.03)
T PALLIDUM AB SER QL: NONREACTIVE
TRICHOMONAS, WET PREP: ABNORMAL
UROBILINOGEN UR STRIP-ACNC: 0.2 E.U./DL
WBC'S/HIGH POWER FIELD, WET PREP: ABNORMAL
YEAST, WET PREP: ABNORMAL

## 2022-08-11 PROCEDURE — 36415 COLL VENOUS BLD VENIPUNCTURE: CPT | Performed by: FAMILY MEDICINE

## 2022-08-11 PROCEDURE — 87389 HIV-1 AG W/HIV-1&-2 AB AG IA: CPT | Performed by: FAMILY MEDICINE

## 2022-08-11 PROCEDURE — 87210 SMEAR WET MOUNT SALINE/INK: CPT | Performed by: FAMILY MEDICINE

## 2022-08-11 PROCEDURE — 87591 N.GONORRHOEAE DNA AMP PROB: CPT | Performed by: FAMILY MEDICINE

## 2022-08-11 PROCEDURE — 87491 CHLMYD TRACH DNA AMP PROBE: CPT | Performed by: FAMILY MEDICINE

## 2022-08-11 PROCEDURE — 81003 URINALYSIS AUTO W/O SCOPE: CPT | Performed by: FAMILY MEDICINE

## 2022-08-11 PROCEDURE — 86780 TREPONEMA PALLIDUM: CPT | Performed by: FAMILY MEDICINE

## 2022-08-11 PROCEDURE — 87340 HEPATITIS B SURFACE AG IA: CPT | Performed by: FAMILY MEDICINE

## 2022-08-11 PROCEDURE — 86803 HEPATITIS C AB TEST: CPT | Performed by: FAMILY MEDICINE

## 2022-08-11 PROCEDURE — 99395 PREV VISIT EST AGE 18-39: CPT | Performed by: FAMILY MEDICINE

## 2022-08-11 RX ORDER — METRONIDAZOLE 500 MG/1
500 TABLET ORAL 2 TIMES DAILY
Qty: 14 TABLET | Refills: 0 | Status: SHIPPED | OUTPATIENT
Start: 2022-08-11 | End: 2022-08-18

## 2022-08-11 RX ORDER — ACYCLOVIR 800 MG/1
800 TABLET ORAL EVERY 12 HOURS
Qty: 30 TABLET | Refills: 3 | Status: SHIPPED | OUTPATIENT
Start: 2022-08-11 | End: 2023-04-19

## 2022-08-11 ASSESSMENT — ENCOUNTER SYMPTOMS
WEAKNESS: 0
FEVER: 0
DIARRHEA: 0
ARTHRALGIAS: 0
CONSTIPATION: 0
COUGH: 0
BREAST MASS: 0
HEMATOCHEZIA: 0
PARESTHESIAS: 0
SORE THROAT: 0
HEARTBURN: 0
ABDOMINAL PAIN: 0
MYALGIAS: 0
NAUSEA: 0
DIZZINESS: 0
HEADACHES: 0
HEMATURIA: 0
PALPITATIONS: 1
FREQUENCY: 1
CHILLS: 0
DYSURIA: 0
NERVOUS/ANXIOUS: 1
EYE PAIN: 0
SHORTNESS OF BREATH: 0
JOINT SWELLING: 0

## 2022-08-11 NOTE — PROGRESS NOTES
Assessment/Plan:   Denise is a 27 year old female here for physical    Routine adult health maintenance  Physical completed today.  Up-to-date with immunizations.  Labs as below.  Working to obtain Pap/colp records but reportedly up-to-date.    Urinary frequency  Pt reports some urinary frequency - UA obtained.  - UA Macro with Reflex to Micro and Culture - lab collect    Screen for STD (sexually transmitted disease)  Pt requests STD screening, ordered as below. Pt is having vaginal discharge - reports recurrent BV - wet prep obtained today as well.  - Neisseria gonorrhoeae PCR  - Chlamydia trachomatis PCR  - Wet prep - Clinic Collect  - Treponema Abs w Reflex to RPR and Titer  - Hepatitis B surface antigen  - Hepatitis C antibody  - HIV Antigen Antibody Combo    Genital herpes simplex, unspecified site  Hx genital HSV, refilled acyclovir for use during flares.  - acyclovir (ZOVIRAX) 800 MG tablet  Dispense: 30 tablet; Refill: 3       I have had an Advance Directives discussion with the patient.    Follow up: 1 year for physical, sooner as needed    Traci Acosta MD  [facility]      Subjective:     Denise Box is a 27 year old female who presents for an annual exam.     Answers for HPI/ROS submitted by the patient on 8/11/2022  Frequency of exercise:: 2-3 days/week  Getting at least 3 servings of Calcium per day:: NO  Diet:: Regular (no restrictions)  Taking medications regularly:: No  Medication side effects:: Not applicable  Bi-annual eye exam:: NO  Dental care twice a year:: NO  Sleep apnea or symptoms of sleep apnea:: Daytime drowsiness  abdominal pain: No  Blood in stool: No  Blood in urine: No  chest pain: No  chills: No  congestion: No  constipation: No  cough: No  diarrhea: No  dizziness: No  ear pain: No  eye pain: No  nervous/anxious: Yes  fever: No  frequency: Yes  genital sores: No  headaches: No  hearing loss: No  heartburn: No  arthralgias: No  joint swelling:  No  peripheral edema: No  mood changes: Yes  myalgias: No  nausea: No  dysuria: No  palpitations: Yes  Skin sensation changes: No  sore throat: No  urgency: No  rash: No  shortness of breath: No  visual disturbance: No  weakness: No  pelvic pain: No  vaginal bleeding: No  vaginal discharge: Yes  tenderness: No  breast mass: No  breast discharge: No  Additional concerns today:: Yes  Duration of exercise:: 15-30 minutes  Barriers to taking medications:: Problems remembering to take them    Dec/Teddy pap at [facility] - squamous cells - around Feb colp and looked ok - reports repeat pap in 1 year  Mom hx cervical cancer    Recurrent BV sx - using boric acid, helping but still has a scent - sometimes gets more discharge - has tried antibiotics and helps temporarily    Genital HSV - not frequent flares    Health Maintenance reviewed:  Lipid Profile: no  Glucose Screen: no  Colonoscopy: no  Mammogram: no    Gynecologic History  Contraception: tubal ligation  Last Pap: 2018. Results were: nml    Immunization History   Administered Date(s) Administered     COVID-19,PF,Moderna 05/18/2021, 06/15/2021, 01/25/2022     DTAP (<7y) 02/13/1998     Flu, Unspecified 09/25/2019     HEPA 09/22/2008, 08/30/2012     HPV 09/22/2008     HPV Quadrivalent 09/22/2008     HPV9 08/24/2018     HepA-ped 2 Dose 09/22/2008, 08/30/2012     HepB 1995, 1995, 1995     HepB, Unspecified 1995, 1995, 1995     Hib (PRP-T) 02/13/1998     Influenza (IIV3) PF 10/27/2008, 10/21/2016     Influenza Vaccine IM > 6 months Valent IIV4 (Alfuria,Fluzone) 09/27/2013, 12/15/2017, 10/09/2020     Influenza,INJ,MDCK,PF,Quad >4yrs 12/20/2021     MMR 06/20/1996, 12/16/1999     Mantoux Tuberculin Skin Test 10/26/1998     Meningococcal (Menactra ) 09/22/2008     OPV, trivalent, live 1995, 1995, 06/20/1996, 12/16/1999     Polio, Unspecified  1995, 1995, 06/20/1996, 12/16/1999     TDAP Vaccine (Boostrix) 01/28/2008     Td  (Adult), Adsorbed 08/24/2018     Tetramune (DtP/HIB) 1995, 1995, 06/20/1996     Varicella 02/13/1998, 09/22/2008     Immunization status: up to date.    Current Outpatient Medications   Medication Sig Dispense Refill     acyclovir (ZOVIRAX) 800 MG tablet Take 1 tablet (800 mg) by mouth every 12 hours for 5 days 30 tablet 3     Past Medical History:   Diagnosis Date     GERD (gastroesophageal reflux disease) 9/22/2008     Past Surgical History:   Procedure Laterality Date     FRENECTOMY  9/4/2012    Procedure: FRENECTOMY;  Frenulectomy;  Surgeon: Neo Shaffer MD;  Location: [facility] OR     No known allergies  Family History   Problem Relation Age of Onset     Heart Failure Mother      Cervical Cancer Mother      Diabetes Mother      Alcoholism Father      Breast Cancer Maternal Grandmother      Diabetes Maternal Grandmother      Deep Vein Thrombosis (DVT) Maternal Grandmother      Heart Disease Maternal Grandfather      Crohn's Disease Paternal Grandmother         colostomy      Social History     Socioeconomic History     Marital status:      Spouse name: Not on file     Number of children: Not on file     Years of education: Not on file     Highest education level: Not on file   Occupational History     Not on file   Tobacco Use     Smoking status: Passive Smoke Exposure - Never Smoker     Smokeless tobacco: Never Used     Tobacco comment: Mom smokes outside.    Substance and Sexual Activity     Alcohol use: Yes     Comment: rare     Drug use: No     Sexual activity: Yes     Partners: Male   Other Topics Concern     Not on file   Social History Narrative     Not on file     Social Determinants of Health     Financial Resource Strain: Not on file   Food Insecurity: Not on file   Transportation Needs: Not on file   Physical Activity: Not on file   Stress: Not on file   Social Connections: Not on file   Intimate Partner Violence: Not on file   Housing Stability: Not on file       Review of Systems  "negative unless noted    Objective:        Vitals:    08/11/22 1022   BP: 96/70   Pulse: 76   SpO2: 99%   Weight: 66.2 kg (146 lb)   Height: 1.549 m (5' 1\")     Body mass index is 27.59 kg/m .    Physical Exam:  General Appearance: Alert, pleasant, appears stated age  Head: Normocephalic, without obvious abnormality  Eyes: PERRL, conjunctiva/corneas clear, EOM's intact  Ears: Normal TM's and external ear canals, both ears  Neck: Supple,without lymphadenopathy, no thyromegaly or nodules noted  Lungs: Clear to auscultation bilaterally, respirations unlabored, no wheezing or crackles  Heart: Regular rate and rhythm, no murmur   Abdomen: Soft, non-tender, no masses, no organomegaly  Extremities: Extremities with strong and symmetric pulses, no cyanosis or edema  Skin: Skin color, texture normal, no rashes or lesions  Neurologic: Grossly normal, no focal deficits  Pelvic exam: normal external genitalia, normal appearing cervix, moderate thin white discharge    "

## 2022-08-12 LAB
C TRACH DNA SPEC QL NAA+PROBE: NEGATIVE
N GONORRHOEA DNA SPEC QL NAA+PROBE: NEGATIVE

## 2022-10-09 ENCOUNTER — HEALTH MAINTENANCE LETTER (OUTPATIENT)
Age: 27
End: 2022-10-09

## 2023-04-19 ENCOUNTER — OFFICE VISIT (OUTPATIENT)
Dept: FAMILY MEDICINE | Facility: CLINIC | Age: 28
End: 2023-04-19
Payer: COMMERCIAL

## 2023-04-19 VITALS
HEIGHT: 61 IN | WEIGHT: 145.8 LBS | BODY MASS INDEX: 27.53 KG/M2 | SYSTOLIC BLOOD PRESSURE: 100 MMHG | DIASTOLIC BLOOD PRESSURE: 64 MMHG | OXYGEN SATURATION: 99 % | TEMPERATURE: 97.9 F | HEART RATE: 71 BPM

## 2023-04-19 DIAGNOSIS — Z12.4 CERVICAL CANCER SCREENING: Primary | ICD-10-CM

## 2023-04-19 DIAGNOSIS — A60.00 GENITAL HERPES SIMPLEX, UNSPECIFIED SITE: ICD-10-CM

## 2023-04-19 DIAGNOSIS — R87.612 LOW GRADE SQUAMOUS INTRAEPITHELIAL LESION ON CYTOLOGIC SMEAR OF CERVIX (LGSIL): ICD-10-CM

## 2023-04-19 DIAGNOSIS — Z00.00 ANNUAL PHYSICAL EXAM: ICD-10-CM

## 2023-04-19 DIAGNOSIS — F90.0 ATTENTION DEFICIT HYPERACTIVITY DISORDER (ADHD), PREDOMINANTLY INATTENTIVE TYPE: ICD-10-CM

## 2023-04-19 LAB
ALBUMIN UR-MCNC: NEGATIVE MG/DL
APPEARANCE UR: CLEAR
BILIRUB UR QL STRIP: NEGATIVE
CHOLEST SERPL-MCNC: 185 MG/DL
CLUE CELLS: NORMAL
COLOR UR AUTO: YELLOW
ERYTHROCYTE [DISTWIDTH] IN BLOOD BY AUTOMATED COUNT: 12.2 % (ref 10–15)
GLUCOSE UR STRIP-MCNC: NEGATIVE MG/DL
HCT VFR BLD AUTO: 38.9 % (ref 35–47)
HDLC SERPL-MCNC: 58 MG/DL
HGB BLD-MCNC: 13.3 G/DL (ref 11.7–15.7)
HGB UR QL STRIP: NEGATIVE
KETONES UR STRIP-MCNC: NEGATIVE MG/DL
LDLC SERPL CALC-MCNC: 113 MG/DL
LEUKOCYTE ESTERASE UR QL STRIP: NEGATIVE
MCH RBC QN AUTO: 32 PG (ref 26.5–33)
MCHC RBC AUTO-ENTMCNC: 34.2 G/DL (ref 31.5–36.5)
MCV RBC AUTO: 94 FL (ref 78–100)
NITRATE UR QL: NEGATIVE
NONHDLC SERPL-MCNC: 127 MG/DL
PH UR STRIP: 6 [PH] (ref 5–8)
PLATELET # BLD AUTO: 204 10E3/UL (ref 150–450)
RBC # BLD AUTO: 4.15 10E6/UL (ref 3.8–5.2)
SP GR UR STRIP: 1.02 (ref 1–1.03)
TRICHOMONAS, WET PREP: NORMAL
TRIGL SERPL-MCNC: 68 MG/DL
UROBILINOGEN UR STRIP-ACNC: 0.2 E.U./DL
WBC # BLD AUTO: 5.6 10E3/UL (ref 4–11)
WBC'S/HIGH POWER FIELD, WET PREP: NORMAL
YEAST, WET PREP: NORMAL

## 2023-04-19 PROCEDURE — 87389 HIV-1 AG W/HIV-1&-2 AB AG IA: CPT | Performed by: FAMILY MEDICINE

## 2023-04-19 PROCEDURE — 86780 TREPONEMA PALLIDUM: CPT | Performed by: FAMILY MEDICINE

## 2023-04-19 PROCEDURE — 36415 COLL VENOUS BLD VENIPUNCTURE: CPT | Performed by: FAMILY MEDICINE

## 2023-04-19 PROCEDURE — G0145 SCR C/V CYTO,THINLAYER,RESCR: HCPCS | Performed by: FAMILY MEDICINE

## 2023-04-19 PROCEDURE — 87210 SMEAR WET MOUNT SALINE/INK: CPT | Performed by: FAMILY MEDICINE

## 2023-04-19 PROCEDURE — 99395 PREV VISIT EST AGE 18-39: CPT | Performed by: FAMILY MEDICINE

## 2023-04-19 PROCEDURE — 87624 HPV HI-RISK TYP POOLED RSLT: CPT | Performed by: FAMILY MEDICINE

## 2023-04-19 PROCEDURE — 87591 N.GONORRHOEAE DNA AMP PROB: CPT | Performed by: FAMILY MEDICINE

## 2023-04-19 PROCEDURE — 93010 ELECTROCARDIOGRAM REPORT: CPT | Performed by: INTERNAL MEDICINE

## 2023-04-19 PROCEDURE — 85027 COMPLETE CBC AUTOMATED: CPT | Performed by: FAMILY MEDICINE

## 2023-04-19 PROCEDURE — 81003 URINALYSIS AUTO W/O SCOPE: CPT | Performed by: FAMILY MEDICINE

## 2023-04-19 PROCEDURE — 87491 CHLMYD TRACH DNA AMP PROBE: CPT | Performed by: FAMILY MEDICINE

## 2023-04-19 PROCEDURE — 93005 ELECTROCARDIOGRAM TRACING: CPT | Performed by: FAMILY MEDICINE

## 2023-04-19 PROCEDURE — 80061 LIPID PANEL: CPT | Performed by: FAMILY MEDICINE

## 2023-04-19 PROCEDURE — 99214 OFFICE O/P EST MOD 30 MIN: CPT | Mod: 25 | Performed by: FAMILY MEDICINE

## 2023-04-19 RX ORDER — FLUOXETINE 40 MG/1
40 CAPSULE ORAL DAILY
COMMUNITY
Start: 2023-03-13

## 2023-04-19 RX ORDER — ACYCLOVIR 800 MG/1
800 TABLET ORAL EVERY 12 HOURS
Qty: 30 TABLET | Refills: 3 | Status: SHIPPED | OUTPATIENT
Start: 2023-04-19 | End: 2024-04-16

## 2023-04-19 ASSESSMENT — ENCOUNTER SYMPTOMS
NAUSEA: 0
HEMATOCHEZIA: 0
HEMATURIA: 0
BREAST MASS: 0
JOINT SWELLING: 0
DIARRHEA: 0
DIZZINESS: 0
CHILLS: 0
ARTHRALGIAS: 0
SORE THROAT: 0
SHORTNESS OF BREATH: 0
EYE PAIN: 0
WEAKNESS: 0
HEARTBURN: 0
MYALGIAS: 0
CONSTIPATION: 0
DYSURIA: 0
COUGH: 0
PARESTHESIAS: 0
HEADACHES: 1
NERVOUS/ANXIOUS: 0
FEVER: 0
ABDOMINAL PAIN: 0
PALPITATIONS: 0
FREQUENCY: 0

## 2023-04-19 ASSESSMENT — PAIN SCALES - GENERAL: PAINLEVEL: NO PAIN (0)

## 2023-04-19 NOTE — PROGRESS NOTES
SUBJECTIVE:   CC: Denise is an 28 year old who presents for preventive health visit.     Chief complaint I would like STD testing; I need an EKG for my therapist to start me on ADHD medicine; I would like a general exam 4/19/2023    12:10 PM   Additional Questions   Roomed by Sis   Patient has been advised of split billing requirements and indicates understanding: Yes  Healthy Habits:     Getting at least 3 servings of Calcium per day:  NO    Bi-annual eye exam:  NO    Dental care twice a year:  Yes    Sleep apnea or symptoms of sleep apnea:  Daytime drowsiness    Diet:  Regular (no restrictions)    Frequency of exercise:  1 day/week    Duration of exercise:  30-45 minutes    Taking medications regularly:  No    Barriers to taking medications:  Problems remembering to take them    Medication side effects:  None    PHQ-2 Total Score: 2    Ability to successfully perform activities of daily living: Yes, no assistance needed  Home safety:  none identified   Hearing impairment: Yes,     History of present illness:Denise presents today for her annual exam; she did have an abnormal Pap in the past and had colposcopy and was told to follow with annual Pap smear examinations.  She does have recurrent type II herpes for which she takes acyclovir when she has an outbreak and we will happy to refill her medication for that.  She would like STD screening today.  The patient is  and she is about to start a new job.  She has been under some stress she says a 3 out of a 10.  She has been on fluoxetine for many years and plans on continuing that medication.  She apparently has some symptoms relating to inattentive ADHD type of disorder and is being evaluated by a therapist and that therapist is requesting an EKG before medication is considered we will be happy to honor that request.  Patient has had some heavier periods now using some supermax pads 2 to 3 days out of 5.  Hence I feel we should do a hemogram.   Patient does not know her lipid status either and despite the fact that she did have breakfast I feel we should do lipid screening.  She was screened in the past for thyroid disease.  Her periods are regular.  She has had a tubal ligation 1 year ago and since that time her periods have gradually gotten a little bit heavier.  She does not not have any gianna metrorrhagia.  System review as stated.  A complete physical exam was done including pelvic exam with an assistant present.  Multiple cultures were taken.  Cervix appeared normal no abnormal discharge        Today's PHQ-2 Score:       4/19/2023    12:04 PM   PHQ-2 ( 1999 [employer])   Q1: Little interest or pleasure in doing things 1   Q2: Feeling down, depressed or hopeless 1   PHQ-2 Score 2   Q1: Little interest or pleasure in doing things Several days    Several days   Q2: Feeling down, depressed or hopeless Several days    Several days   PHQ-2 Score 2    2       Have you ever done Advance Care Planning? (For example, a Health Directive, POLST, or a discussion with a medical provider or your loved ones about your wishes):     Social History     Tobacco Use     Smoking status: Passive Smoke Exposure - Never Smoker     Smokeless tobacco: Never     Tobacco comments:     Mom smokes outside.    Vaping Use     Vaping status: Not on file   Substance Use Topics     Alcohol use: Yes     Comment: rare             4/19/2023    12:04 PM   Alcohol Use   Prescreen: >3 drinks/day or >7 drinks/week? No     Reviewed orders with patient.  Reviewed health maintenance and updated orders accordingly - Yes  Lab work is in process    Breast Cancer Screening:    FHS-7:       8/11/2022    10:06 AM 4/19/2023    12:07 PM   Breast CA Risk Assessment (FHS-7)   Did any of your first-degree relatives have breast or ovarian cancer? Yes Yes   Did any of your relatives have bilateral breast cancer? Unknown Unknown   Did any man in your family have breast cancer? No No   Did any woman in your family  have breast and ovarian cancer? Yes Yes   Did any woman in your family have breast cancer before age 50 y? No No   Do you have 2 or more relatives with breast and/or ovarian cancer? No Unknown   Do you have 2 or more relatives with breast and/or bowel cancer? No Unknown     click delete button to remove this line now    Pertinent mammograms are reviewed under the imaging tab.    History of abnormal Pap smear: YES - updated in Problem List and Health Maintenance accordingly     Reviewed and updated as needed this visit by clinical staff   Tobacco   Meds              Reviewed and updated as needed this visit by Provider                     Review of Systems   Constitutional: Negative for chills and fever.   HENT: Positive for congestion. Negative for ear pain, hearing loss and sore throat.    Eyes: Negative for pain and visual disturbance.   Respiratory: Negative for cough and shortness of breath.    Cardiovascular: Negative for chest pain, palpitations and peripheral edema.   Gastrointestinal: Negative for abdominal pain, constipation, diarrhea, heartburn, hematochezia and nausea.   Breasts:  Negative for tenderness, breast mass and discharge.   Genitourinary: Negative for dysuria, frequency, genital sores, hematuria, pelvic pain, urgency, vaginal bleeding and vaginal discharge.   Musculoskeletal: Negative for arthralgias, joint swelling and myalgias.   Skin: Negative for rash.   Neurological: Positive for headaches. Negative for dizziness, weakness and paresthesias.   Psychiatric/Behavioral: Negative for mood changes. The patient is not nervous/anxious.      CONSTITUTIONAL: NEGATIVE for fever, chills, change in weight  INTEGUMENTARU/SKIN: NEGATIVE for worrisome rashes, moles or lesions  EYES: NEGATIVE for vision changes or irritation  ENT: NEGATIVE for ear, mouth and throat problems  RESP: NEGATIVE for significant cough or SOB  BREAST: NEGATIVE for masses, tenderness or discharge  CV: NEGATIVE for chest pain,  palpitations or peripheral edema  GI: NEGATIVE for nausea, abdominal pain, heartburn, or change in bowel habits  : NEGATIVE for unusual urinary or vaginal symptoms. Periods are regular.  MUSCULOSKELETAL: NEGATIVE for significant arthralgias or myalgia  NEURO: NEGATIVE for weakness, dizziness or paresthesias  PSYCHIATRIC: NEGATIVE for changes in mood or affect     OBJECTIVE:   LMP 04/15/2023   Physical Exam  GENERAL: healthy, alert and no distress  EYES: Eyes grossly normal to inspection, PERRL and conjunctivae and sclerae normal  HENT: ear canals and TM's normal, nose and mouth without ulcers or lesions  NECK: no adenopathy, no asymmetry, masses, or scars and thyroid normal to palpation  RESP: lungs clear to auscultation - no rales, rhonchi or wheezes  BREAST: normal without masses, tenderness or nipple discharge and no palpable axillary masses or adenopathy  CV: regular rate and rhythm, normal S1 S2, no S3 or S4, no murmur, click or rub, no peripheral edema and peripheral pulses strong  ABDOMEN: soft, nontender, no hepatosplenomegaly, no masses and bowel sounds normal  MS: no gross musculoskeletal defects noted, no edema  SKIN: no suspicious lesions or rashes  NEURO: Normal strength and tone, mentation intact and speech normal  PSYCH: mentation appears normal, affect normal/bright        ASSESSMENT/PLAN:       ICD-10-CM    1. Cervical cancer screening  Z12.4 Chlamydia & Gonorrhea by PCR, [facility] - Clinic Collect     HIV Antigen Antibody Combo     Treponema Abs w Reflex to RPR and Titer      2. Annual physical exam  Z00.00 CBC with platelets     UA Macroscopic with reflex to Microscopic and Culture     Lipid panel reflex to direct LDL Fasting      3. Genital herpes simplex, unspecified site  A60.00 acyclovir (ZOVIRAX) 800 MG tablet     Chlamydia & Gonorrhea by PCR, [facility] - Clinic Collect     HIV Antigen Antibody Combo     Treponema Abs w Reflex to RPR and Titer      4. Low grade squamous intraepithelial  "lesion on cytologic smear of cervix (LGSIL)  R87.612       5. Attention deficit hyperactivity disorder (ADHD), predominantly inattentive type  F90.0 EKG 12-lead, tracing only          Patient has been advised of split billing requirements and indicates understanding: Yes      COUNSELING:  Reviewed preventive health counseling, as reflected in patient instructions      BMI:   Estimated body mass index is 27.59 kg/m  as calculated from the following:    Height as of 8/11/22: 1.549 m (5' 1\").    Weight as of 8/11/22: 66.2 kg (146 lb).         She reports that she is a non-smoker but has been exposed to tobacco smoke. She has never used smokeless tobacco.      Anuel Goldsmith MD  [facility]  "

## 2023-04-20 LAB
C TRACH DNA SPEC QL PROBE+SIG AMP: NEGATIVE
HIV 1+2 AB+HIV1 P24 AG SERPL QL IA: NONREACTIVE
N GONORRHOEA DNA SPEC QL NAA+PROBE: NEGATIVE
T PALLIDUM AB SER QL: NONREACTIVE

## 2023-04-21 LAB
ATRIAL RATE - MUSE: 59 BPM
DIASTOLIC BLOOD PRESSURE - MUSE: NORMAL MMHG
INTERPRETATION ECG - MUSE: NORMAL
P AXIS - MUSE: 42 DEGREES
PR INTERVAL - MUSE: 122 MS
QRS DURATION - MUSE: 74 MS
QT - MUSE: 418 MS
QTC - MUSE: 413 MS
R AXIS - MUSE: 52 DEGREES
SYSTOLIC BLOOD PRESSURE - MUSE: NORMAL MMHG
T AXIS - MUSE: 26 DEGREES
VENTRICULAR RATE- MUSE: 59 BPM

## 2023-04-25 PROBLEM — R87.619 ABNORMAL PAP SMEAR OF CERVIX: Status: ACTIVE | Noted: 2022-08-18

## 2023-04-25 LAB
BKR LAB AP GYN ADEQUACY: NORMAL
BKR LAB AP GYN INTERPRETATION: NORMAL
BKR LAB AP HPV REFLEX: NORMAL
BKR LAB AP LMP: NORMAL
BKR LAB AP PREVIOUS ABNL DX: NORMAL
BKR LAB AP PREVIOUS ABNORMAL: NORMAL
PATH REPORT.COMMENTS IMP SPEC: NORMAL
PATH REPORT.COMMENTS IMP SPEC: NORMAL
PATH REPORT.RELEVANT HX SPEC: NORMAL

## 2023-04-26 LAB
HUMAN PAPILLOMA VIRUS 16 DNA: NEGATIVE
HUMAN PAPILLOMA VIRUS 18 DNA: NEGATIVE
HUMAN PAPILLOMA VIRUS FINAL DIAGNOSIS: NORMAL
HUMAN PAPILLOMA VIRUS OTHER HR: NEGATIVE

## 2023-05-08 ENCOUNTER — TELEPHONE (OUTPATIENT)
Dept: FAMILY MEDICINE | Facility: CLINIC | Age: 28
End: 2023-05-08
Payer: COMMERCIAL

## 2023-05-08 NOTE — TELEPHONE ENCOUNTER
"Hi Dr. Goldsmith,   3rd request to review and advise.     Fanta Lerma RN   5/1/2023  6:35 AM CDT       Hi Dr. Goldsmith,  2nd request to review and advise.     Fanta Lerma RN   4/27/2023 11:15 AM CDT       Hi Dr. Goldsmith,   29 yo with a NIL Pap, Neg HR HPV. See her pap hx listed below. Okay to recommend a cotest in 3 years now? Please advise.      Pap Hx:  Pap on 8/27/2021 showed LSIL > colposcopy done 12/20/2021 at [facility] (bx result states \"findings are less severe than recent pap suggest\")  04/19/23 NIL Pap, Neg HR HPV Plan provider review       "

## 2023-07-12 ENCOUNTER — PATIENT OUTREACH (OUTPATIENT)
Dept: CARE COORDINATION | Facility: CLINIC | Age: 28
End: 2023-07-12
Payer: COMMERCIAL

## 2023-07-26 ENCOUNTER — PATIENT OUTREACH (OUTPATIENT)
Dept: CARE COORDINATION | Facility: CLINIC | Age: 28
End: 2023-07-26
Payer: COMMERCIAL

## 2023-10-28 ENCOUNTER — HEALTH MAINTENANCE LETTER (OUTPATIENT)
Age: 28
End: 2023-10-28

## 2023-12-09 ENCOUNTER — OFFICE VISIT (OUTPATIENT)
Dept: URGENT CARE | Facility: URGENT CARE | Age: 28
End: 2023-12-09
Payer: COMMERCIAL

## 2023-12-09 VITALS
DIASTOLIC BLOOD PRESSURE: 74 MMHG | RESPIRATION RATE: 18 BRPM | HEART RATE: 71 BPM | SYSTOLIC BLOOD PRESSURE: 166 MMHG | TEMPERATURE: 98.7 F | OXYGEN SATURATION: 99 % | BODY MASS INDEX: 27.85 KG/M2 | WEIGHT: 145.6 LBS

## 2023-12-09 DIAGNOSIS — L02.215 ABSCESS, PERINEUM: Primary | ICD-10-CM

## 2023-12-09 PROCEDURE — 99213 OFFICE O/P EST LOW 20 MIN: CPT | Performed by: PHYSICIAN ASSISTANT

## 2023-12-09 NOTE — PROGRESS NOTES
URGENT CARE VISIT:    SUBJECTIVE:   Denise Box is a 28 year old female who presents to the clinic with bump on vaginal area that started 3 day(s) ago. Associated symptoms include pain.  Denies fever, chills, and drainage. Symptoms have been stable since onset. Treatments tried include none with no relief of symptoms.       OBJECTIVE:  The patient appears today in alert,no apparent distress distress  VITALS: BP (!) 166/74 (BP Location: Left arm, Patient Position: Sitting, Cuff Size: Adult Regular)   Pulse 71   Temp 98.7  F (37.1  C) (Oral)   Resp 18   Wt 66 kg (145 lb 9.6 oz)   SpO2 99%   BMI 27.85 kg/m    General: WDWN in NAD  Musculoskeletal: moderate ttp over perineum  Skin: small 1 cm nodule over perineum.   Neurology: Sensation to light touch intact      ASSESSMENT:    ICD-10-CM    1. Abscess, perineum  L02.215           PLAN:  Patient Instructions   Patient was educated on the natural course of condition.  Not a candidate for I&D as it is non-fluctuant. Conservative measures discussed including warm compresses, and over-the-counter analgesics (Tylenol or Ibuprofen).  Keep wound clean and dry. See your primary care provider if symptoms worsen or do not improve in 7 days. Seek emergency care if you develop fever, severe swelling, or increased redness.         Jo Stern PA-C ....................  12/9/2023   3:06 PM

## 2024-04-16 ENCOUNTER — OFFICE VISIT (OUTPATIENT)
Dept: FAMILY MEDICINE | Facility: CLINIC | Age: 29
End: 2024-04-16

## 2024-04-16 VITALS
OXYGEN SATURATION: 99 % | BODY MASS INDEX: 28.08 KG/M2 | WEIGHT: 146.8 LBS | DIASTOLIC BLOOD PRESSURE: 79 MMHG | TEMPERATURE: 98.4 F | SYSTOLIC BLOOD PRESSURE: 116 MMHG | HEART RATE: 76 BPM

## 2024-04-16 DIAGNOSIS — Z87.42 HISTORY OF ABNORMAL CERVICAL PAP SMEAR: ICD-10-CM

## 2024-04-16 DIAGNOSIS — Z71.89 BREAST SELF EXAMINATION EDUCATION, ENCOUNTER FOR: ICD-10-CM

## 2024-04-16 DIAGNOSIS — R53.83 FATIGUE, UNSPECIFIED TYPE: Primary | ICD-10-CM

## 2024-04-16 PROCEDURE — 99213 OFFICE O/P EST LOW 20 MIN: CPT

## 2024-04-16 RX ORDER — BUPROPION HYDROCHLORIDE 150 MG/1
150 TABLET ORAL DAILY
COMMUNITY

## 2024-04-16 RX ORDER — METHYLPHENIDATE HYDROCHLORIDE 5 MG/1
5 TABLET ORAL
COMMUNITY
Start: 2023-10-30

## 2024-04-16 RX ORDER — METHYLPHENIDATE HYDROCHLORIDE 20 MG/1
20 CAPSULE, EXTENDED RELEASE ORAL DAILY
COMMUNITY

## 2024-04-16 ASSESSMENT — PATIENT HEALTH QUESTIONNAIRE - PHQ9
10. IF YOU CHECKED OFF ANY PROBLEMS, HOW DIFFICULT HAVE THESE PROBLEMS MADE IT FOR YOU TO DO YOUR WORK, TAKE CARE OF THINGS AT HOME, OR GET ALONG WITH OTHER PEOPLE: VERY DIFFICULT
SUM OF ALL RESPONSES TO PHQ QUESTIONS 1-9: 10
SUM OF ALL RESPONSES TO PHQ QUESTIONS 1-9: 10

## 2024-04-16 ASSESSMENT — ENCOUNTER SYMPTOMS: FATIGUE: 1

## 2024-04-16 NOTE — PROGRESS NOTES
"  Assessment & Plan     Fatigue, unspecified type  Patient going through a number of life changes possibly related to fatigue. States that she has been experiencing this for a number of years and states her brother is similar. Her fatigue may just also be genetic. Patient does not currently have insurance so she will call to see cost of labs but ordered a TSH, CMP and vitamin D for further evaluation. States she has been taking vitamin D to help with fatigue without much improvement.   - TSH with free T4 reflex; Future  - Comprehensive metabolic panel (BMP + Alb, Alk Phos, ALT, AST, Total. Bili, TP); Future  - Vitamin D deficiency screening; Future    History of abnormal cervical Pap smear  Patient had a pap completed last year and plans for her to complete again in three years. Is not due to a pap today.     Breast self examination education, encounter for  Discussed how to complete self breast exams at home.             BMI  Estimated body mass index is 28.08 kg/m  as calculated from the following:    Height as of 4/19/23: 1.54 m (5' 0.63\").    Weight as of this encounter: 66.6 kg (146 lb 12.8 oz).       Depression Screening Follow Up        4/16/2024     1:12 PM   PHQ   PHQ-9 Total Score 10   Q9: Thoughts of better off dead/self-harm past 2 weeks Not at all     Follow Up Actions Taken  Patient has experienced a recent significant life event.  Patient counseled, no additional follow up at this time.     Has been going through a divorce so mood has waxed and wanes. Reports that mood has improved and is followed by an outside clinic that management her psych medications.     Irwin Walker is a 29 year old, presenting for the following health issues:  Establish Care (Lab work, skin check for moles, breast exam, possible pap-had abnormal pap in past) and Fatigue (Feeling lethargic)      4/16/2024     1:33 PM   Additional Questions   Roomed by adnny mcknight   Accompanied by partner     Fatigue  Associated symptoms " include fatigue.   History of Present Illness       Reason for visit:  Lethargy and other symptoms  Symptom onset:  More than a month  Symptom intensity:  Moderate  Symptom progression:  Staying the same  Had these symptoms before:  Yes  Has tried/received treatment for these symptoms:  Yes  Previous treatment was successful:  No    She eats 2-3 servings of fruits and vegetables daily.She consumes 4 sweetened beverage(s) daily.She exercises with enough effort to increase her heart rate 10 to 19 minutes per day.  She exercises with enough effort to increase her heart rate 3 or less days per week. She is missing 1 dose(s) of medications per week.  She is not taking prescribed medications regularly due to remembering to take.     Feels like her symptoms may be related to the seasons. Declines weight loss or gain.                 Objective    /79 (BP Location: Right arm, Patient Position: Sitting, Cuff Size: Adult Regular)   Pulse 76   Temp 98.4  F (36.9  C) (Temporal)   Wt 66.6 kg (146 lb 12.8 oz)   LMP 03/18/2024 (Within Days)   SpO2 99%   BMI 28.08 kg/m    Body mass index is 28.08 kg/m .  Physical Exam   GENERAL: alert and no distress  RESP: lungs clear to auscultation - no rales, rhonchi or wheezes  CV: regular rate and rhythm, normal S1 S2, no S3 or S4, no murmur, click or rub, no peripheral edema  SKIN: left upper abdominal skin tag.  PSYCH: mentation appears normal, affect normal/bright            Signed Electronically by: JUAN Peoples CNP

## 2024-08-28 ENCOUNTER — LAB (OUTPATIENT)
Dept: LAB | Facility: CLINIC | Age: 29
End: 2024-08-28

## 2024-08-28 DIAGNOSIS — R53.83 FATIGUE, UNSPECIFIED TYPE: ICD-10-CM

## 2024-08-28 LAB
ALBUMIN SERPL BCG-MCNC: 4.5 G/DL (ref 3.5–5.2)
ALP SERPL-CCNC: 52 U/L (ref 40–150)
ALT SERPL W P-5'-P-CCNC: 10 U/L (ref 0–50)
ANION GAP SERPL CALCULATED.3IONS-SCNC: 10 MMOL/L (ref 7–15)
AST SERPL W P-5'-P-CCNC: 20 U/L (ref 0–45)
BILIRUB SERPL-MCNC: 0.3 MG/DL
BUN SERPL-MCNC: 14 MG/DL (ref 6–20)
CALCIUM SERPL-MCNC: 9.4 MG/DL (ref 8.8–10.4)
CHLORIDE SERPL-SCNC: 105 MMOL/L (ref 98–107)
CREAT SERPL-MCNC: 0.8 MG/DL (ref 0.51–0.95)
EGFRCR SERPLBLD CKD-EPI 2021: >90 ML/MIN/1.73M2
GLUCOSE SERPL-MCNC: 93 MG/DL (ref 70–99)
HCO3 SERPL-SCNC: 24 MMOL/L (ref 22–29)
POTASSIUM SERPL-SCNC: 4.5 MMOL/L (ref 3.4–5.3)
PROT SERPL-MCNC: 7.2 G/DL (ref 6.4–8.3)
SODIUM SERPL-SCNC: 139 MMOL/L (ref 135–145)
TSH SERPL DL<=0.005 MIU/L-ACNC: 1.37 UIU/ML (ref 0.3–4.2)
VIT D+METAB SERPL-MCNC: 30 NG/ML (ref 20–50)

## 2024-08-28 PROCEDURE — 84443 ASSAY THYROID STIM HORMONE: CPT

## 2024-08-28 PROCEDURE — 36415 COLL VENOUS BLD VENIPUNCTURE: CPT

## 2024-08-28 PROCEDURE — 82306 VITAMIN D 25 HYDROXY: CPT

## 2024-08-28 PROCEDURE — 80053 COMPREHEN METABOLIC PANEL: CPT

## 2024-12-21 ENCOUNTER — HEALTH MAINTENANCE LETTER (OUTPATIENT)
Age: 29
End: 2024-12-21